# Patient Record
Sex: MALE | Race: WHITE | NOT HISPANIC OR LATINO | Employment: STUDENT | URBAN - METROPOLITAN AREA
[De-identification: names, ages, dates, MRNs, and addresses within clinical notes are randomized per-mention and may not be internally consistent; named-entity substitution may affect disease eponyms.]

---
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
## 2018-01-09 NOTE — MISCELLANEOUS
Message   Recorded as Task   Date: 05/23/2016 04:20 PM, Created By: Jose Simpson   Task Name: Medical Complaint Callback   Assigned To: Yolanda Jain   Regarding Patient: Stacy Hinson, Status: Active   CommentLaird Ast - 23 May 2016 4:20 PM     TASK CREATED  Caller: Eneida Witt, Mother; Medical Complaint; [phone] (Day)  Mom request to speak with our nurse regarding Ricky medication prednisone  Please call mom  Thanks  Yolanda Jain - 23 May 2016 4:43 PM     TASK EDITED  HE IS DECREASING TO 25 MG FOR ONE WEEKA ND MOM WILL CALL WITH UPDATE        Active Problems    1  Crohn's disease of small intestine without complication (070 0) (S47 85)   2  Gastroesophageal reflux disease with esophagitis (530 11) (K21 0)   3  Vitamin D deficiency (268 9) (E55 9)    Current Meds   1  Folic Acid 1 MG Oral Tablet; TAKE 1 TABLET DAILY; Therapy: 56CMG1592 to (Corinna Larsen)  Requested for: 40MWP8367; Last   CA:59MGX0458 Ordered   2  NexIUM 40 MG Oral Capsule Delayed Release (Esomeprazole Magnesium); Therapy: (Recorded:92Tux6438) to Recorded   3  Singulair 10 MG Oral Tablet (Montelukast Sodium); Therapy: (Recorded:43Fok1773) to Recorded   4  Vitamin D 2000 UNIT Oral Capsule; take one capsule daily by mouth; Therapy: 49DLZ9553 to (Evaluate:86Ouu4579); Last Rx:17Clz4243 Ordered    Allergies    1   Amoxicillin TABS    Signatures   Electronically signed by : Shruthi Damon, ; May 23 2016  4:47PM EST                       (Author)

## 2018-01-09 NOTE — MISCELLANEOUS
Message   Recorded as Task   Date: 03/22/2016 03:36 PM, Created By: Roxy Higgins   Task Name: Medical Complaint Callback   Assigned To: Yolanda Jain   Regarding Patient: Brodie Garay, Status: Active   CommentDernadine Bailon - 22 Mar 2016 3:36 PM     TASK CREATED  Caller: CHECO, Mother; Medical Complaint; [phone]  MOM CALLED  MOM SAID THAT DURING THE LAST OFFICE VISIT THERE WAS A CONSULT ABOUT STARTING AZATHIOPRINE   MOM WANTS TO KNOW IF SOMETHING ELSE CAN BE DONE BEFORE STARTING THE MEDICATION  MOM IS HESISTANT ON STARTING THE MEDICATION DUE TO THE SIDE EFFECTS AND RISKS ASSOCIATED WITH THE MEDICATION  Yolanda Jain - 22 Mar 2016 3:43 PM     TASK REASSIGNED: Previously Assigned To Yolanda Jain  I'm not sure what else you were planning to give him without side effects  Bharathi Bearden - 22 Mar 2016 4:59 PM     TASK REPLIED TO: Previously Assigned To Bharathi Bearden  All of the medications have similar side effects  Keep him on steroids until f/u--will discuss again  Yolanda Jain - 23 Mar 2016 8:17 AM     TASK REASSIGNED: Previously Assigned To Yolanda Jain  DIANA IS NOT ON STEROIDS YET  LOOKS LIKE FROM YOUR NOTE WE ARE WAITING FOR TPMT   QUANTIFERON GOLD NEGATIVE  YOU WERE WAITING FOR THESE RESULTS AND THEN WOULD START PREDNISONE AND AZATHIOPRINE  DO YOU WANT TO START PREDNISONE NOW? Bharathi Bearden - 23 Mar 2016 8:26 AM     TASK REPLIED TO: Previously Assigned To Bharathi Bearden  Start Prednisone 40 mg/day  Ask family to call in 1 week with report  Yolanda Jain - 23 Mar 2016 9:01 AM     TASK REASSIGNED: Previously Assigned To Yolanda Jain  HAD CONVERSATION WITH MOM AND EXPLAINED WHY AND HOW PREDNISONE WORKS  MOM DID NOT UNDERSTAND WHY PREDNISONE IS USED  EXPLAINED TO HER THAT HE WILL NEED TO BE ON SOME SORT OF MEDICATION FOR THE CROHNS  MOM SEEMED TO HAVE A BETTER UNDERSTANDING AFTER OUR CONVERSATION   SHE WILL START PREDNISONE AND DISCUSS AT F/U   Bharathi Bearden - 23 Mar 2016 10:58 AM     TASK REPLIED TO: Previously Assigned To Bharathi Bearden  Thanks        Active Problems    1  Abnormal weight loss (783 21) (R63 4)   2  Crohn's disease of small intestine without complication (124 1) (L58 48)   3  Gastroesophageal reflux disease with esophagitis (530 11) (K21 0)   4  Vitamin D deficiency (268 9) (E55 9)    Current Meds   1  NexIUM 40 MG Oral Capsule Delayed Release (Esomeprazole Magnesium); Therapy: (Recorded:03Feb2016) to Recorded   2  PredniSONE 10 MG Oral Tablet; take 4 tablets ( 40 mg )   by mouth daily in am;   Therapy: 13VSF2555 to (Evaluate:21Jul2016)  Requested for: 70VQU9694; Last   Rx:23Mar2016 Ordered   3  Singulair 10 MG Oral Tablet (Montelukast Sodium); Therapy: (Recorded:03Feb2016) to Recorded   4  Vitamin D 2000 UNIT Oral Capsule; take one capsule daily by mouth; Therapy: 60RFW0964 to (Evaluate:11Jun2016); Last Rx:93Vsc3939 Ordered    Allergies    1   Amoxicillin TABS    Signatures   Electronically signed by : Andres Olson, ; Mar 23 2016 11:05AM EST                       (Author)

## 2018-01-09 NOTE — MISCELLANEOUS
Message   Recorded as Task   Date: 03/20/2016 06:34 AM, Created By: Esteban Fernandes   Task Name: Call Patient with results   Assigned To: Yolanda Jain   Regarding Patient: Telly Briones, Status: Active   Comment:    Bharathi Bearden - 20 Mar 2016 6:34 AM     Patient Phone: [phone]      Quantiferon gold is negative  Task to Lizzie Bi - 21 Mar 2016 8:37 AM     TASK REASSIGNED: Previously Assigned To Bharathi Bearden        Active Problems    1  Abnormal weight loss (783 21) (R63 4)   2  Crohn's disease of small intestine without complication (185 6) (F62 05)   3  Gastroesophageal reflux disease with esophagitis (530 11) (K21 0)   4  Vitamin D deficiency (268 9) (E55 9)    Current Meds   1  NexIUM 40 MG Oral Capsule Delayed Release (Esomeprazole Magnesium); Therapy: (Recorded:00Rap0605) to Recorded   2  Singulair 10 MG Oral Tablet (Montelukast Sodium); Therapy: (Recorded:28Vyo8797) to Recorded   3  Vitamin D 2000 UNIT Oral Capsule; take one capsule daily by mouth; Therapy: 19JEB1731 to (Evaluate:11Jun2016); Last Rx:04Wgm7631 Ordered    Allergies    1   Amoxicillin TABS    Signatures   Electronically signed by : Deonte Ricardo, ; Mar 21 2016  8:46AM EST                       (Author)

## 2018-01-09 NOTE — RESULT NOTES
Message   Task to Benedict Lawrence  ESR 13, improved   CRP 10 5 improved   CMP normal   CBC ok except mild WBC and platelet elevation--consistent with the ESR and CRP     Verified Results  (1) CBC/PLT/DIFF 41XHJ7018 04:10PM Ovidio Bearden     Test Name Result Flag Reference   WBC COUNT 12 15 Thousand/uL H 4 31-10 16   RBC COUNT 4 66 Million/uL  3 88-5 62   HEMOGLOBIN 12 4 g/dL  12 0-17 0   HEMATOCRIT 38 4 %  36 5-49 3   MCV 82 fL  82-98   MCH 26 6 pg L 26 8-34 3   MCHC 32 3 g/dL  31 4-37 4   RDW 14 4 %  11 6-15 1   MPV 9 2 fL  8 9-12 7   PLATELET COUNT 308 Thousands/uL H 149-390   nRBC AUTOMATED 0 /100 WBCs     NEUTROPHILS RELATIVE PERCENT 78 % H 43-75   LYMPHOCYTES RELATIVE PERCENT 15 %  14-44   MONOCYTES RELATIVE PERCENT 7 %  4-12   EOSINOPHILS RELATIVE PERCENT 0 %  0-6   BASOPHILS RELATIVE PERCENT 0 %  0-1   NEUTROPHILS ABSOLUTE COUNT 9 48 Thousands/?L H 1 85-7 62   LYMPHOCYTES ABSOLUTE COUNT 1 77 Thousands/?L  0 60-4 47   MONOCYTES ABSOLUTE COUNT 0 85 Thousand/?L  0 17-1 22   EOSINOPHILS ABSOLUTE COUNT 0 03 Thousand/?L  0 00-0 61   BASOPHILS ABSOLUTE COUNT 0 02 Thousands/?L  0 00-0 10     (1) COMPREHENSIVE METABOLIC PANEL 70OHJ6479 68:78EE Ovidio Bearden     Test Name Result Flag Reference   GLUCOSE,RANDM 108 mg/dL     If the patient is fasting, the ADA then defines impaired fasting glucose as > 100 mg/dL and diabetes as > or equal to 123 mg/dL     SODIUM 141 mmol/L  136-145   POTASSIUM 3 7 mmol/L  3 5-5 3   CHLORIDE 103 mmol/L  100-108   CARBON DIOXIDE 32 mmol/L  21-32   ANION GAP (CALC) 6 mmol/L  4-13   BLOOD UREA NITROGEN 12 mg/dL  5-25   CREATININE 0 90 mg/dL  0 60-1 30   Standardized to IDMS reference method   CALCIUM 9 0 mg/dL  8 3-10 1   BILI, TOTAL 0 20 mg/dL  0 20-1 00   ALK PHOSPHATAS 114 U/L     ALT (SGPT) 24 U/L  12-78   AST(SGOT) 11 U/L  5-45   ALBUMIN 3 6 g/dL  3 5-5 0   TOTAL PROTEIN 7 6 g/dL  6 4-8 2   eGFR Non-African American      >60 0 ml/min/1 73sq daiana Cavanaugh [place] [employer] Disease Education Program recommendations are as follows:  GFR calculation is accurate only with a steady state creatinine  Chronic Kidney disease less than 60 ml/min/1 73 sq  meters  Kidney failure less than 15 ml/min/1 73 sq  meters       (1) C-REACTIVE PROTEIN 79VQM1297 04:09PM David Bearden     Test Name Result Flag Reference   C-REACT PROTEIN 10 9 mg/L H <3 0     (1) SED RATE 92JNZ7903 04:09PM David Bearden     Test Name Result Flag Reference   SED RATE 13 mm/hour H 0-10       Signatures   Electronically signed by : MICHAEL Antoine ; Jun 20 2016  8:31AM EST                       (Author)

## 2018-01-10 NOTE — RESULT NOTES
Message   Task to [facility], CRP 21, elevated   ESR 20, mild elevation   CMP normal   CBC mild WBC elevation     Verified Results  (1) CBC/PLT/DIFF 74VQQ8040 04:27PM Brennen Bearden     Test Name Result Flag Reference   WBC COUNT 10 85 Thousand/uL H 4 31-10 16   RBC COUNT 4 79 Million/uL  3 88-5 62   HEMOGLOBIN 12 5 g/dL  12 0-17 0   HEMATOCRIT 39 2 %  36 5-49 3   MCV 82 fL  82-98   MCH 26 1 pg L 26 8-34 3   MCHC 31 9 g/dL  31 4-37 4   RDW 15 1 %  11 6-15 1   MPV 8 8 fL L 8 9-12 7   PLATELET COUNT 612 Thousands/uL  149-390   nRBC AUTOMATED 0 /100 WBCs     NEUTROPHILS RELATIVE PERCENT 80 % H 43-75   LYMPHOCYTES RELATIVE PERCENT 10 % L 14-44   MONOCYTES RELATIVE PERCENT 10 %  4-12   EOSINOPHILS RELATIVE PERCENT 0 %  0-6   BASOPHILS RELATIVE PERCENT 0 %  0-1   NEUTROPHILS ABSOLUTE COUNT 8 63 Thousands/?L H 1 85-7 62   LYMPHOCYTES ABSOLUTE COUNT 1 09 Thousands/?L  0 60-4 47   MONOCYTES ABSOLUTE COUNT 1 11 Thousand/?L  0 17-1 22   EOSINOPHILS ABSOLUTE COUNT 0 01 Thousand/?L  0 00-0 61   BASOPHILS ABSOLUTE COUNT 0 01 Thousands/?L  0 00-0 10     (1) COMPREHENSIVE METABOLIC PANEL 24MMV9720 53:03VT Brennen Bearden     Test Name Result Flag Reference   GLUCOSE,RANDM 94 mg/dL     If the patient is fasting, the ADA then defines impaired fasting glucose as > 100 mg/dL and diabetes as > or equal to 123 mg/dL     SODIUM 137 mmol/L  136-145   POTASSIUM 3 8 mmol/L  3 5-5 3   CHLORIDE 100 mmol/L  100-108   CARBON DIOXIDE 32 mmol/L  21-32   ANION GAP (CALC) 5 mmol/L  4-13   BLOOD UREA NITROGEN 14 mg/dL  5-25   CREATININE 0 80 mg/dL  0 60-1 30   Standardized to IDMS reference method   CALCIUM 9 1 mg/dL  8 3-10 1   BILI, TOTAL 0 15 mg/dL L 0 20-1 00   ALK PHOSPHATAS 140 U/L     ALT (SGPT) 42 U/L  12-78   AST(SGOT) 15 U/L  5-45   ALBUMIN 3 6 g/dL  3 5-5 0   TOTAL PROTEIN 8 0 g/dL  6 4-8 2   eGFR Non-African American      >60 0 ml/min/1 73sq m   [facility] [employer] Disease Education Program recommendations are as follows:  GFR calculation is accurate only with a steady state creatinine  Chronic Kidney disease less than 60 ml/min/1 73 sq  meters  Kidney failure less than 15 ml/min/1 73 sq  meters       (1) SED RATE [id number] 03:34PM Ovidio Bearden     Test Name Result Flag Reference   SED RATE 20 mm/hour H 0-10     (1) C-REACTIVE PROTEIN 93USK3539 03:34PM Ovidio Bearden     Test Name Result Flag Reference   C-REACT PROTEIN 21 5 mg/L H <3 0   15       Signatures   Electronically signed by : MICHAEL Morales ; May 14 2016  6:06PM EST                       (Author)

## 2018-01-10 NOTE — MISCELLANEOUS
Message   Recorded as Task   Date: 03/11/2016 12:49 PM, Created By: Moses Lau   Task Name: Medical Complaint Callback   Assigned To: Maria Teresa Magdaleno   Regarding Patient: Edgardo Richardson, Status: Active   CommentClary Doug - 11 Mar 2016 12:49 PM     TASK CREATED  Caller: CHECO, Mother; Medical Complaint; [phone]  MOM CALLED TO SCHEDULE AN APPT FOR YOU NEXT WEEK AS A FOLLOW UP REGARDING THE TESTING THE HAD DONE ON TUESDAY  YOUR SCHEDULE IS BOOKED AND I AM NOT SURE WHERE I CAN PUT HIM   Bharathi Bearden - 11 Mar 2016 5:59 PM     TASK REPLIED TO: Previously Assigned To Bharathi Bearden  do your best--let's discuss Monday   Maria Teresa Magdaleno - 14 Mar 2016 2:04 PM     TASK EDITED  SEE OTHER MESSAGE        Active Problems    1  Abdominal pain, periumbilical (406 25) (X43 06)   2  Abnormal weight loss (783 21) (R63 4)   3  Vitamin D deficiency (268 9) (E55 9)    Current Meds   1  NexIUM 40 MG Oral Capsule Delayed Release (Esomeprazole Magnesium); Therapy: (Recorded:90Vou1192) to Recorded   2  Singulair 10 MG Oral Tablet (Montelukast Sodium); Therapy: (Recorded:44Xqa7720) to Recorded   3  Vitamin D 2000 UNIT Oral Capsule; take one capsule daily by mouth; Therapy: 05VYD6670 to (Evaluate:11Jun2016); Last Rx:08Oef1667 Ordered    Allergies    1   Amoxicillin TABS    Signatures   Electronically signed by : Prashant Cadena, ; Mar 14 2016  2:05PM EST                       (Author)

## 2018-01-10 NOTE — RESULT NOTES
Verified Results  (1) CBC/PLT/DIFF 54HHN5664 05:25PM [facility] Order Number: [id number]     Test Name Result Flag Reference   WBC COUNT 5 94 Thousand/uL  4 31-10 16   RBC COUNT 4 35 Million/uL  3 88-5 62   HEMOGLOBIN 13 1 g/dL  12 0-17 0   HEMATOCRIT 39 9 %  36 5-49 3   MCV 92 fL  82-98   MCH 30 1 pg  26 8-34 3   MCHC 32 8 g/dL  31 4-37 4   RDW 14 2 %  11 6-15 1   MPV 9 4 fL  8 9-12 7   PLATELET COUNT 814 Thousands/uL  149-390   nRBC AUTOMATED 0 /100 WBCs     This is an appended report  These results have been appended to a previously preliminary verified report  This is an appended report  These results have been appended to a previously verified report  NEUTROPHILS - REL 22 % L 43-75   LYMPHOCYTES - REL 60 % H 14-44   MONOCYTES - REL 12 %  4-12   EOSINOPHILS - REL 0 %  0-6   BASOPHILS - REL 1 %  0-1   ATYPICAL LYMPH 5 % H <=0   NEUTROPHILS ABS 1 31 Thousand/uL L 1 85-7 62   LYMPHOTCYTES ABS 3 56 Thousand/uL  0 60-4 47   MONOCYTES ABS 0 71 Thousand/uL  0 00-1 22   EOSINOPHILS ABS 0 00 Thousand/uL  0 00-0 40   BASOPHILS ABS 0 06 Thousand/uL  0 00-0 10   TOTAL COUNTED 100     RBC MORPHOLOGY Normal     PLT ESTIMATE Adequate  Adequate     (1) C-REACTIVE PROTEIN 15PHV2597 05:25PM [facility] Order Number: [id number]     Test Name Result Flag Reference   C-REACT PROTEIN 6 2 mg/L H <3 0     (1) COMPREHENSIVE METABOLIC PANEL 24KSO3353 87:29BU [facility] Order Number: [id number]     Test Name Result Flag Reference   GLUCOSE,RANDM 89 mg/dL     If the patient is fasting, the ADA then defines impaired fasting glucose as > 100 mg/dL and diabetes as > or equal to 123 mg/dL     SODIUM 140 mmol/L  136-145   POTASSIUM 4 1 mmol/L  3 5-5 3   CHLORIDE 104 mmol/L  100-108   CARBON DIOXIDE 32 mmol/L  21-32   ANION GAP (CALC) 4 mmol/L  4-13   BLOOD UREA NITROGEN 7 mg/dL  5-25   CREATININE 0 69 mg/dL  0 60-1 30   Standardized to IDMS reference method   CALCIUM 9 2 mg/dL  8 3-10 1   BILI, TOTAL 0 52 mg/dL  0 20-1 00   ALK PHOSPHATAS 320 U/L     ALT (SGPT) 179 U/L H 12-78   AST(SGOT) 78 U/L H 5-45   ALBUMIN 3 6 g/dL  3 5-5 0   TOTAL PROTEIN 8 0 g/dL  6 4-8 2   eGFR Non-African American      >60 0 ml/min/1 73sq m   [facility] Disease Education Program recommendations are as follows:  GFR calculation is accurate only with a steady state creatinine  Chronic Kidney disease less than 60 ml/min/1 73 sq  meters  Kidney failure less than 15 ml/min/1 73 sq  meters  (1) Marcell Sykes VIRUS [id number] 05:25PM Norbert Bearden Order Number: [id number]     Test Name Result Flag Reference   EBV EARLY ANTIGEN AB, IGG 67 3 U/mL H 0 0 - 8 9   Hepatitis A, Hepatitis C and HIV antibodies may cross-react  with this assay                                     Negative        < 9 0                                   Equivocal  9 0 - 10 9                                   Positive        >10 9   [place] VCA IGG 45 0 U/mL H 0 0 - 17 9   Negative        <18 0                                   Equivocal 18 0 - 21 9                                   Positive        >21 9   [place] VCA IGM >160 0 U/mL H 0 0 - 35 9   Negative        <36 0                                   Equivocal 36 0 - 43 9                                   Positive        >43 9   [place] NUCLEAR ANTIGEN AB IGG <18 0 U/mL  0 0 - 17 9   Negative        <18 0                                   Equivocal 18 0 - 21 9                                   Positive        >21 9   EBV INTERPRETATION Comment     EBV Interpretation Chart  Interpretation   EBV-IgM  EA(D)-IgG  VCA-IgG  EBNA-IgG  EBV Seronegative    -        -         -          -  Early Phase         +        -         -          -  Acute Primary       +       +or-       +          -  Infection  Convalescence/Past  -       +or-       +          +  Infection  Reactivated        +or-      +         +          +  Infection         + Antibody Present - Antibody Absent    Performed at:  [address]  [id number]  : Leo Manley MD, Phone:  [phone]     (1) SED RATE [id number] 05:25PM Ancelmo Gomez Order Number: [id number]     Test Name Result Flag Reference   SED RATE 20 mm/hour H 0-10

## 2018-01-10 NOTE — MISCELLANEOUS
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  If not, I would like to change him to oral methotrexate within the next week or 2  Yolanda Jain - 25 Jul 2016 4:18 PM     TASK REASSIGNED: Previously Assigned To Yolanda Jain      SPOKE WITH MOM REGARDING THE MTX INJECTIONS AND EXPLAINED THAT THEY CANNOT GET THE PREFILLED SYRINGES BUT IT WILL COME IN A SINGLE VIAL DOSE AND I CAN TEACH TO DRAW UP AND INJECT  MOM SAID SHE WOULD SPEAK TO DIANA AND SHE CALLED BACK AND STATES THAT DIANA WOULD LIKE TO TRY THE ORAL MTX   Bharathi Bearden - 25 Jul 2016 5:09 PM     TASK REPLIED TO: Previously Assigned To Kali Park  I am ok with oral--if we do that we should start ASAP so we can see how he does  Yolanda Jain - 25 Jul 2016 5:39 PM     TASK REASSIGNED: Previously Assigned To Yolanda Jain    MOM AWARE TO START 25 MG WEEKLY DO ON A FRIDAY EVENING  TABS ARE 2 5 MG WILL TAKE 10 TABS WEEKLY  MOM TO CALL INFUSION CENTER TO CX INFUSIONS        Active Problems    1  Crohn's disease of small intestine without complication (809 9) (Y81 45)   2  Gastroesophageal reflux disease with esophagitis (530 11) (K21 0)   3  Vitamin D deficiency (268 9) (E55 9)    Current Meds   1  Esomeprazole Magnesium 40 MG Oral Capsule Delayed Release (NexIUM); TAKE 1   CAPSULE Once PRN heartburn; Last Rx:69Fjb7846 Ordered   2  Folic Acid 1 MG Oral Tablet; TAKE 1 TABLET DAILY;    Therapy: 88ITS2702 to (Evaluate:01Sep2016)  Requested for: 05NOJ7097; Last   ZQ:43IUV8695 Ordered   3  PredniSONE 10 MG Oral Tablet; take 5 mg daily; Therapy: 60ICJ5892 to (Evaluate:20Sep2016)  Requested for: 95Fqv7794; Last   Rx:47Cqb1191 Ordered   4  Singulair 10 MG Oral Tablet (Montelukast Sodium); Therapy: (Recorded:21Epm0408) to Recorded   5  Vitamin D 2000 UNIT Oral Capsule; take one capsule daily by mouth; Therapy: 99BCL8636 to (Evaluate:11Jun2016); Last Rx:80Hhn3391 Ordered    Allergies    1   Amoxicillin TABS    Signatures   Electronically signed by : Deonte Ricardo, ; Jul 25 2016  5:40PM EST                       (Author)

## 2018-01-10 NOTE — MISCELLANEOUS
Message   Recorded as Task   Date: 10/04/2016 03:50 PM, Created By: John Adams   Task Name: Follow Up   Assigned To: Yolanda Jain   Regarding Patient: Isamar Bartlett, Status: Active   CommentCosette Borer - 04 Oct 2016 3:50 PM     TASK CREATED  CBC and CMP, CRP are okay        Active Problems    1  Crohn's disease of small intestine without complication (605 2) (D33 23)   2  Gastroesophageal reflux disease with esophagitis (530 11) (K21 0)   3  Vitamin D deficiency (268 9) (E55 9)    Current Meds   1  Famotidine 20 MG Oral Tablet; take 1 tablet by mouth twice a day; Therapy: 10Eqo1183 to (Evaluate:36Qow5404)  Requested for: 12Fnb5989; Last   Rx:15Mjl0817; Status: [facility]  to Pharmacy - Awaiting Verification   Ordered   2  Folic Acid 1 MG Oral Tablet; TAKE 1 TABLET DAILY; Therapy: 51OTH9809 to (Evaluate:23Afi8164)  Requested for: 00Quj2394; Last   Rx:49Lro1821 Ordered   3  Methotrexate 2 5 MG Oral Tablet; TAKE 10 TABLETS WEEKLY; Therapy: 17INV5894 to (Kenyatta Martel)  Requested for: 82Vmw1980; Last   Rx:11Bzh3675 Ordered   4  Singulair 10 MG Oral Tablet (Montelukast Sodium); Therapy: (Recorded:44Voi3542) to Recorded    Allergies    1   Amoxicillin TABS    Signatures   Electronically signed by : Joseluis Barnes, ; Oct  4 2016  4:08PM EST                       (Author)

## 2018-01-10 NOTE — MISCELLANEOUS
Message   Recorded as Task   Date: 06/13/2016 02:51 PM, Created By: Gladis Sofia   Task Name: Medical Complaint Callback   Assigned To: Yolanda Jain   Regarding Patient: Natalie Enrique, Status: Active   CommentBryant Cho - 13 Jun 2016 2:51 PM     TASK CREATED  Caller: Savanna Stroud, Mother; Medical Complaint; [phone] (Day)  Mom called if script for BW can faxt to the same lab [facility] get his mtx injection  Pt is going for this injection on Friday 06/17/2016 at 3:30pm    Yolanda Jain - 14 Jun 2016 4:22 PM     TASK EDITED  mom aware to get labs out patient before mtx on friday        Active Problems    1  Crohn's disease of small intestine without complication (835 8) (Y72 00)   2  Gastroesophageal reflux disease with esophagitis (530 11) (K21 0)   3  Vitamin D deficiency (268 9) (E55 9)    Current Meds   1  Folic Acid 1 MG Oral Tablet; TAKE 1 TABLET DAILY; Therapy: 69ZEE5330 to (Ines Dust)  Requested for: 71UMP5837; Last   RZ:49AYK4308 Ordered   2  NexIUM 40 MG Oral Capsule Delayed Release (Esomeprazole Magnesium); Therapy: (Recorded:50Nnx7828) to Recorded   3  PredniSONE 10 MG Oral Tablet; TAKE 2 TABLETS DAILY; Therapy: 85NKX4328 to (Evaluate:08Jun2016)  Requested for: 92YIP5339; Last   Rx:01Jun2016 Ordered   4  Singulair 10 MG Oral Tablet (Montelukast Sodium); Therapy: (Recorded:55Xod1699) to Recorded   5  Vitamin D 2000 UNIT Oral Capsule; take one capsule daily by mouth; Therapy: 14WEZ2246 to (Evaluate:11Jun2016); Last Rx:41Mvd0256 Ordered    Allergies    1   Amoxicillin TABS    Signatures   Electronically signed by : Jackelyn Fritz, ; Jun 14 2016  4:22PM EST                       (Author)

## 2018-01-10 NOTE — RESULT NOTES
Message   Quantiferon gold is negative  Task [facility]     Verified Results  (1) QUANTIFERON - TB GOLD 73ICI0380 05:28PM [facility]ule[facility]Ov[facility]   Performed at:  [address]  [id number]  : Matt Ramirez MD, Phone:  [phone]     Test Name Result Flag Reference   QUANTIFERON TB GOLD      Specimen incubated at [address] [address] TB GOLD Negative  Negative   QUANTIFERON CRITERIA Comment     To be considered positive a specimen should have a TB Ag minus Nilvalue greater than or equal to 0 35 IU/mL and in addition the TB Agminus Nil value must be greater than or equal to 25% of the Nilvalue  There may be insufficient information in these values todifferentiate between some negative and some indeterminate testvalues  QUANTIFERON TB AG VALUE 0 08 IU/mL     QUANTIFERON NIL VALUE 0 09 IU/mL     QUANTIFERON MITOGEN VALUE 8 65 IU/mL     QFT TB AG MINUS NIL VALUE <0 01 IU/mL     QFT INTERPRETATION Comment     The QuantiFERON TB Gold (in Tube) assay is intended for use as an aidin the diagnosis of TB infection  Negative results suggest that thereis no TB infection  In patients with high suspicion of exposure, anegative test should be repeated  A positive test indicates infectionwith Mycobacterium tuberculosis  Among individuals withouttuberculosis infection, a positive test may be due to exposure Claudio  mary, M  josue or M  marinum  On the Internet, go tocdc gov/tb for further details         Signatures   Electronically signed by : MICHAEL Morales ; Mar 20 2016  6:34AM EST                       (Author)

## 2018-01-11 NOTE — MISCELLANEOUS
Message   Recorded as Task   Date: 03/25/2016 03:31 PM, Created By: Davie Ham   Task Name: Follow Up   Assigned To: Yolanda Jain   Regarding Patient: Natasha Lovell, Status: Active   Comment:    Bharathi Bearden - 25 Mar 2016 3:31 PM     TASK CREATED  TPMT is normal        Active Problems    1  Abnormal weight loss (783 21) (R63 4)   2  Crohn's disease of small intestine without complication (545 6) (L32 48)   3  Gastroesophageal reflux disease with esophagitis (530 11) (K21 0)   4  Vitamin D deficiency (268 9) (E55 9)    Current Meds   1  NexIUM 40 MG Oral Capsule Delayed Release (Esomeprazole Magnesium); Therapy: (Recorded:14Fwt6951) to Recorded   2  PredniSONE 10 MG Oral Tablet; take 4 tablets ( 40 mg )   by mouth daily in am;   Therapy: 73XRE7760 to (Evaluate:09Nhp5244)  Requested for: 67XLQ3447; Last   Rx:23Mar2016 Ordered   3  Singulair 10 MG Oral Tablet (Montelukast Sodium); Therapy: (Recorded:31Hsm3447) to Recorded   4  Vitamin D 2000 UNIT Oral Capsule; take one capsule daily by mouth; Therapy: 53MGB1752 to (Evaluate:11Jun2016); Last Rx:03Szb2863 Ordered    Allergies    1   Amoxicillin TABS    Signatures   Electronically signed by : Hao Tadeo, ; Mar 25 2016  3:48PM EST                       (Author)

## 2018-01-11 NOTE — RESULT NOTES
Message  Periodic surveillance laboratories are within normal limits- Sailaja Reed has follow-up during winter break on the 28th of this month  Signatures   Electronically signed by : JACKIE Ashley;  Dec  5 2016  9:17AM EST                       (Author)

## 2018-01-11 NOTE — RESULT NOTES
Message   ESR normal   CRP 6 8, mildly elevated but improving   CMP ok   CBC ok   Task to Jennifer     Verified Results  (1) CBC/PLT/DIFF 37Iwo6024 01:10PM Joséanoopalpesh Clarissa Checos Order Number: [id number]     Test Name Result Flag Reference   WBC COUNT 8 00 Thousand/uL  4 31-10 16   WBC ADJUSTED 8 00 Thousand/uL  4 31-10 16   RBC COUNT 4 91 Million/uL  3 88-5 62   HEMOGLOBIN 12 6 g/dL  12 0-17 0   HEMATOCRIT 40 7 %  36 5-49 3   MCV 83 fL  82-98   MCH 25 6 pg L 26 8-34 3   MCHC 30 9 g/dL L 31 4-37 4   RDW 16 2 % H 11 6-15 1   MPV 7 5 fL L 8 9-12 7   PLATELET COUNT 688 Thousands/uL  149-390   GRANULOCYTES - REL 71 2 %  47-80   LYMPHOCYTES RELATIVE PERCENT 27 %  14-44   MONOCYTES RELATIVE PERCENT 2 % L 4-12   GRANS ABS 5 70 Thousand/?L  1 85-7 82   LYMPHOCYTES ABSOLUTE COUNT 2 20 Thousands/?L  0 60-4 47   MONOCYTES ABSOLUTE COUNT 0 10 Thousand/?L L 0 17-1 22     (1) COMPREHENSIVE METABOLIC PANEL 59ZJA0409 93:97IU JoséanoopalpeshCandyon Checos Order Number: [id number]     Test Name Result Flag Reference   GLUCOSE,RANDM 91 mg/dL     If the patient is fasting, the ADA then defines impaired fasting glucose as > 100 mg/dL and diabetes as > or equal to 123 mg/dL     SODIUM 141 mmol/L  136-145   POTASSIUM 4 4 mmol/L  3 5-5 3   CHLORIDE 106 mmol/L  100-108   CARBON DIOXIDE 28 mmol/L  21-32   ANION GAP (CALC) 7 mmol/L  4-13   BLOOD UREA NITROGEN 10 mg/dL  5-25   CREATININE 1 03 mg/dL  0 60-1 30   Standardized to IDMS reference method   CALCIUM 9 3 mg/dL  8 3-10 1   BILI, TOTAL 0 30 mg/dL  0 20-1 00   ALK PHOSPHATAS 88 U/L     ALT (SGPT) <6 U/L L 12-78   AST(SGOT) 6 U/L  5-45   ALBUMIN 4 0 g/dL  3 5-5 0   TOTAL PROTEIN 7 1 g/dL  6 4-8 2   eGFR Non-African American      >60 0 ml/min/1 73sq m   [facility] Disease Education Program recommendations are as follows:  GFR calculation is accurate only with a steady state creatinine  Chronic Kidney disease less than 60 ml/min/1 73 sq  meters  Kidney failure less than 15 ml/min/1 73 sq  meters       (1) SED RATE 59Tcg0986 11:30AM Ashely Bearden Order Number: [id number]     Test Name Result Flag Reference   SED RATE 6 mm/hour  0-10     (1) C-REACTIVE PROTEIN 90Bap7596 11:30AM Ashely Bearden Order Number: [id number]   Order Number: [id number]     Test Name Result Flag Reference   C-REACT PROTEIN 6 8 mg/L H <3 0

## 2018-01-11 NOTE — MISCELLANEOUS
Message   Recorded as Task   Date: 05/23/2016 04:20 PM, Created By: Lianne Jc   Task Name: Medical Complaint Callback   Assigned To: Yolanda Jain   Regarding Patient: Sherice Fernandez, Status: Active   CommentRodrigo Alex - 23 May 2016 4:20 PM     TASK CREATED  Caller: Rosio Dotson, Mother; Medical Complaint; [phone] (Day)  Mom request to speak with our nurse regarding Ricky medication prednisone  Please call mom  Thanks  Yolanda Jain - 23 May 2016 4:43 PM     TASK EDITED  HE IS DECREASING TO 25 MG FOR ONE WEEKA ND MOM WILL CALL WITH UPDATE        Active Problems    1  Crohn's disease of small intestine without complication (626 7) (H14 86)   2  Gastroesophageal reflux disease with esophagitis (530 11) (K21 0)   3  Vitamin D deficiency (268 9) (E55 9)    Current Meds   1  Folic Acid 1 MG Oral Tablet; TAKE 1 TABLET DAILY; Therapy: 55MDD5206 to (Villalba Officer)  Requested for: 50XCA2451; Last   ZZ:67RKT9311 Ordered   2  NexIUM 40 MG Oral Capsule Delayed Release (Esomeprazole Magnesium); Therapy: (Recorded:50Xqw8114) to Recorded   3  Singulair 10 MG Oral Tablet (Montelukast Sodium); Therapy: (Recorded:01Ukx3095) to Recorded   4  Vitamin D 2000 UNIT Oral Capsule; take one capsule daily by mouth; Therapy: 16DUL8978 to (Evaluate:11Jun2016); Last Rx:28Dxo8275 Ordered    Allergies    1   Amoxicillin TABS    Signatures   Electronically signed by : Andres Olson, ; May 23 2016  4:50PM EST                       (Author)

## 2018-01-11 NOTE — RESULT NOTES
Message      Task to [facility]   Distal ileum abnormal, most consistent with Crohn's     Verified Results  MRI ENTEROGRAPHY W WO 08OXN6280 10:00AM Edvin Bearden     Test Name Result Flag Reference   MRI ENTEROGRAPHY W WO (Report)     MRI ABDOMEN AND PELVIS WITH AND WITHOUT CONTRAST (MR ENTEROGRAPHY)     INDICATION: Abdominal pain, 15 pound weight loss  COMPARISON: No prior imaging at this institution  TECHNIQUE: The following pulse sequences of the abdomen and pelvis were obtained on a 1 5 T scanner: Coronal 2D FIESTA multiphase with fat saturation, axial 2D FIESTA with fat saturation, axial and coronal T2, pre-contrast coronal T1 with fat    saturation, post-contrast dynamic coronal T1 at 20, 70, and 180 seconds with fat saturation, and axial T1 post-contrast with fat saturation through the abdomen and pelvis  1500 mL of oral contrast containing 0 1% (wt/vol) barium suspension with sorbitol    (VoLumen) was administered 45 minutes prior to scanning  1 mg of glucagons was administered IM prior to contrast administration by the radiology nurse  6 mL of Gadavist gadolinium was administered intravenously without immediate complication  Note that dynamic imaging was tailored for evaluation of the bowel with limited evaluation of the remainder of the abdominal and pelvic viscera  FINDINGS:     ABDOMEN:   BOWEL:  There is a long contiguous segment of distal and terminal ileum with bowel wall thickening up to 8 mm, and bowel wall hyperenhancement (series 10 images 38 through 72 and series 11 images 119 through 158 ) Findings are consistent with active    inflammatory or infectious enteritis with Crohn's disease most likely  No evidence of fistula formation  No evidence of fibrostenotic disease  LIVER: Normal       GALLBLADDER: Normal      PANCREAS: Normal      ADRENAL GLANDS: Normal      SPLEEN: Normal      KIDNEYS: Normal      LUNG BASES: Unremarkable MRI appearance       PELVIS:   REPRODUCTIVE STRUCTURES:  Age-appropriate  BLADDER: Normal      OTHER STRUCTURES OF THE ABDOMEN AND PELVIS   OSSEOUS STRUCTURES:  No osseous destruction  VASCULAR STRUCTURES: Visualized vasculature is normal      ABDOMINOPELVIC CAVITY: No lymphadenopathy  ABDOMINOPELVIC WALL: Normal        IMPRESSION:     There is a long contiguous segment of distal and terminal ileum with bowel wall thickening and hyperenhancement (series 10 images 38 through 72 and series 11 images 119 through 158 ) Findings are consistent with active inflammatory or infectious    enteritis with Crohn's disease most likely         Workstation performed: [id number]     Signed by:   Bonita John MD   2/29/16   1250       Signatures   Electronically signed by : MICHAEL Odonnell ; Feb 29 2016  4:44PM EST                       (Author)

## 2018-01-11 NOTE — MISCELLANEOUS
Message   Recorded as Task   Date: 08/29/2016 12:02 PM, Created By: Eliazar Styles   Task Name: Med Renewal Request   Assigned To: Yolanda Jain   Regarding Patient: Anjel Yousif, Status: Active   CommentLisy Barajas - 29 Aug 2016 12:02 PM     TASK CREATED  Caller: adalgisa, Mother; Renew Medication; [phone]  please refill folic acid to pharmacy [facility] Anais Lopez - 29 Aug 2016 3:29 PM     TASK EDITED               sent        Active Problems    1  Crohn's disease of small intestine without complication (228 9) (O67 80)   2  Gastroesophageal reflux disease with esophagitis (530 11) (K21 0)   3  Vitamin D deficiency (268 9) (E55 9)    Current Meds   1  Famotidine 20 MG Oral Tablet; take 1 tablet by mouth twice a day; Therapy: 73Xse0684 to (Evaluate:49Qwe0869)  Requested for: 95Vrz2429; Last   Rx:56Bjy1711; Status: [address] to Pharmacy - Awaiting Verification   Ordered   2  Folic Acid 1 MG Oral Tablet; TAKE 1 TABLET DAILY; Therapy: 28HCG2282 to (Evaluate:51Xpu4592)  Requested for: 22Tro4973; Last   Rx:91Qrw5191 Ordered   3  Methotrexate 2 5 MG Oral Tablet; TAKE 10 TABLETS WEEKLY; Therapy: 67IDY5427 to (Gudelia Barba)  Requested for: 26Cap1474; Last   Rx:27Uen8623 Ordered   4  Singulair 10 MG Oral Tablet (Montelukast Sodium); Therapy: (Recorded:82Gnb6522) to Recorded    Allergies    1   Amoxicillin TABS    Signatures   Electronically signed by : Nell Seip, ; Aug 29 2016  3:29PM EST                       (Author)

## 2018-01-11 NOTE — MISCELLANEOUS
Message   Recorded as Task   Date: 07/22/2016 02:56 PM, Created By: Arlet Paul   Task Name: Medical Complaint Callback   Assigned To: Yolanda Jain   Regarding Patient: Natalie Enrique, Status: Active   Comment:    Yolanda Jain - 22 Jul 2016 2:56 PM     TASK CREATED  left message for mom to decrease to 5 mg and call in one week with update        Active Problems    1  Crohn's disease of small intestine without complication (944 0) (V91 60)   2  Gastroesophageal reflux disease with esophagitis (530 11) (K21 0)   3  Vitamin D deficiency (268 9) (E55 9)    Current Meds   1  Esomeprazole Magnesium 40 MG Oral Capsule Delayed Release (NexIUM); TAKE 1   CAPSULE Once PRN heartburn; Last Rx:80Bex0172 Ordered   2  Folic Acid 1 MG Oral Tablet; TAKE 1 TABLET DAILY; Therapy: 36PAY7198 to (Nilsa Figueroa)  Requested for: 34ZKB7897; Last   KZ:48GRB3903 Ordered   3  Methotrexate Sodium 25 MG/ML SOLN;   Therapy: (Recorded:18Wbi8343) to Recorded   4  PredniSONE 10 MG Oral Tablet; TAKE 10 MG Daily; Therapy: 79OQS7324 to (Evaluate:89Cpr2833)  Requested for: 53DRM2214; Last   Rx:43Hlg5416 Ordered   5  Singulair 10 MG Oral Tablet (Montelukast Sodium); Therapy: (Recorded:26Thb4914) to Recorded   6  Vitamin D 2000 UNIT Oral Capsule; take one capsule daily by mouth; Therapy: 24RYE3409 to (Evaluate:11Jun2016); Last Rx:19Swy4820 Ordered    Allergies    1   Amoxicillin TABS    Signatures   Electronically signed by : Jackelyn Fritz, ; Jul 22 2016  2:57PM EST                       (Author)

## 2018-01-12 VITALS
SYSTOLIC BLOOD PRESSURE: 124 MMHG | BODY MASS INDEX: 23.95 KG/M2 | DIASTOLIC BLOOD PRESSURE: 64 MMHG | WEIGHT: 171.08 LBS | HEIGHT: 71 IN | TEMPERATURE: 96.8 F

## 2018-01-12 NOTE — MISCELLANEOUS
Message   Recorded as Task   Date: 06/06/2016 11:42 AM, Created By: Irais Santiago   Task Name: Follow Up   Assigned To: Yolanda Jain   Regarding Patient: Stacy Hinson, Status: Active   CommentElonda Kayley - 06 Jun 2016 11:42 AM     TASK CREATED  mom states he is doing fine with the 20 mg prednisone and will stay on that dose until he comes back from vacation because he will miss two doses of mtx  mom to call while on vacation if he has increased s/s        Active Problems    1  Crohn's disease of small intestine without complication (508 1) (G89 19)   2  Gastroesophageal reflux disease with esophagitis (530 11) (K21 0)   3  Vitamin D deficiency (268 9) (E55 9)    Current Meds   1  Folic Acid 1 MG Oral Tablet; TAKE 1 TABLET DAILY; Therapy: 27DEU9930 to (Ada Cantrell)  Requested for: 54QYV0937; Last   SW:59MIS9740 Ordered   2  NexIUM 40 MG Oral Capsule Delayed Release (Esomeprazole Magnesium); Therapy: (Recorded:19Bvy4304) to Recorded   3  PredniSONE 10 MG Oral Tablet; TAKE 2 TABLETS DAILY; Therapy: 08ELJ0134 to (Evaluate:08Jun2016)  Requested for: 39TTL8518; Last   Rx:01Jun2016 Ordered   4  Singulair 10 MG Oral Tablet (Montelukast Sodium); Therapy: (Recorded:09Esu3082) to Recorded   5  Vitamin D 2000 UNIT Oral Capsule; take one capsule daily by mouth; Therapy: 00LDR9407 to (Evaluate:11Jun2016); Last Rx:53Pkz0247 Ordered    Allergies    1   Amoxicillin TABS    Signatures   Electronically signed by : Shruthi Damon, ; Jun 6 2016 11:42AM EST                       (Author)

## 2018-01-12 NOTE — MISCELLANEOUS
Message   Recorded as Task   Date: 03/01/2016 12:39 PM, Created By: Georgina Desai   Task Name: Medical Complaint Callback   Assigned To: Yolanda Jain   Regarding Patient: Mayra Monterroso, Status: Active   CommentVenus Mcmahan - 01 Mar 2016 12:39 PM     TASK CREATED  Caller: adaglisa, Mother; Medical Complaint; [phone]  Mom is calling because she would like for you to call her with the MRE results   Yolanda Jain - 01 Mar 2016 1:28 PM     TASK EDITED  see other note        Active Problems    1  Abdominal pain, periumbilical (880 53) (B09 56)   2  Abnormal weight loss (783 21) (R63 4)   3  Vitamin D deficiency (268 9) (E55 9)    Current Meds   1  NexIUM 40 MG Oral Capsule Delayed Release (Esomeprazole Magnesium); Therapy: (Recorded:69Rxy3799) to Recorded   2  Singulair 10 MG Oral Tablet (Montelukast Sodium); Therapy: (Recorded:20Gzw4273) to Recorded   3  Vitamin D 2000 UNIT Oral Capsule; take one capsule daily by mouth; Therapy: 14PTU1859 to (Evaluate:11Jun2016); Last Rx:80Dmc6482 Ordered    Allergies    1   Amoxicillin TABS    Signatures   Electronically signed by : Papa Crocker, ; Mar  1 2016  1:28PM EST                       (Author)

## 2018-01-12 NOTE — MISCELLANEOUS
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)   4  Gastroesophageal reflux disease with esophagitis (530 11) (K21 0)   5  Vitamin D deficiency (268 9) (E55 9)    Current Meds   1  Famotidine 20 MG Oral Tablet; Take one tablet every 12 hours as needed for abdominal   pain; Therapy: 35Ami3199 to (Evaluate:28Mar2017)  Requested for: 59Izq4124; Last   Rx:12Jge1397 Ordered   2  Folic Acid 1 MG Oral Tablet; TAKE 1 TABLET DAILY; Therapy: 14CNC5171 to (Mo Coffin)  Requested for: 97Riz5515; Last   Rx:33Vyt3335 Ordered   3  Methotrexate 2 5 MG Oral Tablet; TAKE 10 TABLETS WEEKLY; Therapy: 30VBO9417 to (Mo Coffin)  Requested for: 16Cyk2036; Last   Rx:43Veg4288 Ordered   4  Singulair 10 MG Oral Tablet (Montelukast Sodium); Therapy: (Recorded:11Okx9829) to Recorded    Allergies    1   Amoxicillin TABS    Signatures   Electronically signed by : Andres Olson, ; May 26 2017 10:07AM EST                       (Author)

## 2018-01-12 NOTE — MISCELLANEOUS
Message   Recorded as Task   Date: 06/08/2017 07:33 AM, Created By: Hawa Rivero   Task Name: Call Patient with results   Assigned To: Yolanda Jain   Regarding Patient: Lorne Garcia, Status: Active   Comment:    Bharathi Bearden - 08 Jun 2017 7:33 AM     Patient Phone: [phone]      Task to [facility]  CBC better  Repeat 1 month   Mary Ward - 08 Jun 2017 8:43 AM     TASK REASSIGNED: Previously Assigned To Jose Argueta - 08 Jun 2017 8:46 AM     TASK EDITED  pt has f/u with you this afternoon   Bharathi Bearden - 08 Jun 2017 9:08 AM     TASK REPLIED TO: Previously Assigned To Bharathi Bearden  ok        Active Problems    1  Abnormal CBC (790 6) (R79 89)   2  Crohn's disease of small intestine without complication (166 6) (H40 41)   3  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)   4  Gastroesophageal reflux disease with esophagitis (530 11) (K21 0)   5  Vitamin D deficiency (268 9) (E55 9)    Current Meds   1  Famotidine 20 MG Oral Tablet; Take one tablet every 12 hours as needed for abdominal   pain; Therapy: 67Aug3084 to (Evaluate:28Mar2017)  Requested for: 30Avy6367; Last   Rx:51Hxj8303 Ordered   2  Folic Acid 1 MG Oral Tablet; TAKE 1 TABLET DAILY; Therapy: 22PHJ7912 to (Quinter Coke)  Requested for: 03Kzq8700; Last   Rx:00Wqe6226 Ordered   3  Methotrexate 2 5 MG Oral Tablet; TAKE 10 TABLETS WEEKLY; Therapy: 46CBK6619 to (Quinter Coke)  Requested for: 92Gez2065; Last   Rx:16Whv0194 Ordered   4  Singulair 10 MG Oral Tablet (Montelukast Sodium); Therapy: (Recorded:84Uds5837) to Recorded    Allergies    1   Amoxicillin TABS    Signatures   Electronically signed by : Contreras Sheehan, ; Jun 8 2017  9:13AM EST                       (Author)

## 2018-01-12 NOTE — MISCELLANEOUS
Message   Recorded as Task   Date: 02/25/2016 11:56 AM, Created By: Fazal Bates   Task Name: Financial Authorization   Assigned To: Yolanda Jain   Regarding Patient: Miguelangel Santiago, Status: Active   Comment:    Bharathi Bearden - 25 Feb 2016 11:56 AM     TASK CREATED  Looks like he needs EGD/colon  When is his MRE? Yolanda Jain - 25 Feb 2016 12:06 PM     TASK REASSIGNED: Previously Assigned To Yolanda Jain  will schedule scopes after results of MRE        Active Problems    1  Abdominal pain, periumbilical (173 85) (U23 89)   2  Abnormal weight loss (783 21) (R63 4)   3  Vitamin D deficiency (268 9) (E55 9)    Current Meds   1  NexIUM 40 MG Oral Capsule Delayed Release (Esomeprazole Magnesium); Therapy: (Recorded:01Xfz3036) to Recorded   2  Singulair 10 MG Oral Tablet (Montelukast Sodium); Therapy: (Recorded:03Vpu0349) to Recorded   3  Vitamin D 2000 UNIT Oral Capsule; take one capsule daily by mouth; Therapy: 64WMR9662 to (Evaluate:59Mfx5470); Last Rx:55Xxg7426 Ordered    Allergies    1   Amoxicillin TABS    Signatures   Electronically signed by : Janeen Moore, ; Feb 25 2016  1:16PM EST                       (Author)

## 2018-01-13 VITALS
DIASTOLIC BLOOD PRESSURE: 70 MMHG | TEMPERATURE: 98.2 F | SYSTOLIC BLOOD PRESSURE: 122 MMHG | BODY MASS INDEX: 23.47 KG/M2 | WEIGHT: 173.28 LBS | HEIGHT: 72 IN

## 2018-01-13 VITALS
SYSTOLIC BLOOD PRESSURE: 125 MMHG | TEMPERATURE: 96.9 F | HEIGHT: 72 IN | BODY MASS INDEX: 24.63 KG/M2 | DIASTOLIC BLOOD PRESSURE: 74 MMHG | WEIGHT: 181.88 LBS

## 2018-01-13 NOTE — MISCELLANEOUS
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)   3  Gastroesophageal reflux disease with esophagitis (530 11) (K21 0)   4  Vitamin D deficiency (268 9) (E55 9)    Current Meds   1  Famotidine 20 MG Oral Tablet; Take one tablet every 12 hours as needed for abdominal   pain; Therapy: 24Lxi1683 to (Evaluate:28Mar2017)  Requested for: 23ZYF8488; Last   Rx:46Pbf1226 Ordered   2  Folic Acid 1 MG Oral Tablet; TAKE 1 TABLET DAILY; Therapy: 90GJB6165 to (Osman Ambriz)  Requested for: 58QVE4804; Last   Rx:10Guz9356 Ordered   3  Methotrexate 2 5 MG Oral Tablet; TAKE 10 TABLETS WEEKLY; Therapy: 84ZII5999 to (Osman Ambriz)  Requested for: 44Edy6502; Last   Rx:96Etg4811 Ordered   4  Singulair 10 MG Oral Tablet (Montelukast Sodium); Therapy: (Recorded:99Vtt4169) to Recorded    Allergies    1   Amoxicillin TABS    Signatures   Electronically signed by : Hussein Ulloa, ; Jun 23 2017 11:18AM EST                       (Author)

## 2018-01-13 NOTE — MISCELLANEOUS
Message   Recorded as Task   Date: 07/23/2016 09:28 PM, Created By: Kimmie Whatley   Task Name: Call Patient with results   Assigned To: Yolanda Jain   Regarding Patient: Shazia Smith, Status: Active   Comment:    Bharathi Bearden - 23 Jul 2016 9:28 PM     Patient Phone: [phone]      ESR normal   JoséBharathi saeed - 23 Jul 2016 9:29 PM     CRP 6 8, mildly elevated but improving   Bharathi Bearden - 23 Jul 2016 9:29 PM     CMP ok   Bharathi Bearden - 23 Jul 2016 9:29 PM     CBC ok  Task to Nessa Villarreal - 25 Jul 2016 1:56 PM     TASK REASSIGNED: Previously Assigned To Bharathi Bearden Cynthia - 25 Jul 2016 4:00 PM     TASK EDITED      MOM AWARE OF LAB RESULTS        Active Problems    1  Crohn's disease of small intestine without complication (293 7) (V17 39)   2  Gastroesophageal reflux disease with esophagitis (530 11) (K21 0)   3  Vitamin D deficiency (268 9) (E55 9)    Current Meds   1  Esomeprazole Magnesium 40 MG Oral Capsule Delayed Release (NexIUM); TAKE 1   CAPSULE Once PRN heartburn; Last Rx:47Yvy1256 Ordered   2  Folic Acid 1 MG Oral Tablet; TAKE 1 TABLET DAILY; Therapy: 86IHS6013 to (Emily Chopra)  Requested for: 48NAN4200; Last   IP:32ISM2657 Ordered   3  Methotrexate Sodium 25 MG/ML SOLN;   Therapy: (Recorded:63Yac0495) to Recorded   4  PredniSONE 10 MG Oral Tablet; take 5 mg daily; Therapy: 11LML4622 to (Evaluate:36Rqr1071)  Requested for: 00Saw1967; Last   Rx:25Sxk6173 Ordered   5  Singulair 10 MG Oral Tablet (Montelukast Sodium); Therapy: (Recorded:25Gep0502) to Recorded   6  Vitamin D 2000 UNIT Oral Capsule; take one capsule daily by mouth; Therapy: 58YVR3514 to (Evaluate:42Ltw7960); Last Rx:28Xbv7203 Ordered    Allergies    1   Amoxicillin TABS    Signatures   Electronically signed by : Hussein Ulloa, ; Jul 25 2016  4:01PM EST                       (Author)

## 2018-01-13 NOTE — MISCELLANEOUS
Message   Recorded as Task   Date: 10/11/2016 01:37 PM, Created By: Kostas Bentley   Task Name: Medical Complaint Callback   Assigned To: Yolanda Jain   Regarding Patient: Isamar Bartlett, Status: Active   CommentBernerd Jolynn - 11 Oct 2016 1:37 PM     TASK CREATED  mom aware labs normal and scripts sent for next 3 months  script sent for MTX to mail order        Active Problems    1  Crohn's disease of small intestine without complication (411 9) (X21 18)   2  Gastroesophageal reflux disease with esophagitis (530 11) (K21 0)   3  Vitamin D deficiency (268 9) (E55 9)    Current Meds   1  Famotidine 20 MG Oral Tablet; take 1 tablet by mouth twice a day; Therapy: 76Iry1317 to (Evaluate:66Noe6235)  Requested for: 95Lsl8272; Last   Rx:94Lsr1416; Status: [address]  to Pharmacy - Awaiting Verification   Ordered   2  Folic Acid 1 MG Oral Tablet; TAKE 1 TABLET DAILY; Therapy: 93EXG3280 to (Evaluate:37Jfk1348)  Requested for: 43Kbe9303; Last   Rx:00Qvh1551 Ordered   3  Methotrexate 2 5 MG Oral Tablet; TAKE 10 TABLETS WEEKLY; Therapy: 84XTJ5676 to (Evaluate:20Esj1630)  Requested for: 92KTF6690; Last   Rx:11Oct2016 Ordered   4  Singulair 10 MG Oral Tablet (Montelukast Sodium); Therapy: (Recorded:98Okx7740) to Recorded    Allergies    1   Amoxicillin TABS    Signatures   Electronically signed by : Joseluis Barnes, ; Oct 11 2016  4:50PM EST                       (Author)

## 2018-01-13 NOTE — RESULT NOTES
Message   Task to Samaria Prajapati   Upper biopsies are normal--fungal stains are pending  Colon biopsies show lymphoid aggregates but no acute or chronic colitis  Only abnormalities are small bowel  Verified Results  (1) TISSUE EXAM [id number] 10:18AM MadieRoland low    normal     Test Name Result Flag Reference   LAB AP CASE REPORT (Report)     Surgical Pathology Report             Case: [id number]                   Authorizing Provider: Hector Kinney MD     Collected:      03/08/2016 1018        Ordering Location:   [address] [place] [address]   Received:      03/09/2016 [address] [facility]                               Pathologist:      Louise Wilkins MD                                Specimens:  A) - Duodenum, duodenum biopsy                                     B) - Stomach, antrum biopsy                                      C) - Esophagus, esophagus biopsy                                    D) - Colon, right ascending biopsy                                   E) - Colon, transverse biopsy                                     F) - Colon, descending colon biopsy                                  G) - Colon, sigmoid biopsy   LAB AP FINAL DIAGNOSIS (Report)     A  Duodenum, biopsy:  -Benign small intestinal mucosa with retained villous architecture and no   evidence of increased intraepithelial lymphocytes  -Mild non-specific chronic inflammation of lamina propria seen   -No evidence of dysplasia or malignancy  -Multiple deeper levels examined  B  Stomach, antrum biopsy:  -Benign gastric-oxyntoantral type mucosa with mild chronic inactive   gastritis and single lymphoid follicle formation   -No evidence of Paneth cell metaplasia seen   -No evidence of dysplasia or malignancy   -No H Pylori organisms identified on immunohistochemical stained slide     Control reacted appropriately    C  Esophagus, esophagus biopsy:  -Benign squamous epithelium with mild chronic inflammation and no   eosinophilic infiltrate   -No evidence of eosinophilic esophagitis   -No evidence of Goblet cell metaplasia/ Barrettï¾'s esophagus identified   -No evidence of dysplasia or malignancy  Fungal stains will be ordered and the result will be issued in the   supplemental report      D  Colon, right ascending biopsy:  -Benign colonic mucosa with intramucosal lymphoid aggregate   -No evidence of active colitis   -No evidence of lymphocytic or collagenous colitis   -No evidence of dysplasia or malignancy seen  E  Colon, transverse biopsy:  -Benign colonic mucosa with intramucosal lymphoid aggregate   -No evidence of active colitis   -No evidence of lymphocytic or collagenous colitis   -No evidence of dysplasia or malignancy seen  F  Colon, descending colon biopsy:  Benign colonic mucosa with no significant histopathological changes  No evidence of active colitis   -No evidence of lymphocytic or collagenous colitis   -No evidence of dysplasia or malignancy seen  G  Colon, sigmoid biopsy:  -Benign colonic mucosa with edema of lamina propria and single   intramucosal lymphoid aggregate   -No evidence of active colitis   -No evidence of lymphocytic colitis   -No evidence of dysplasia or malignancy seen  -Trichrome stains will be ordered and the result will be issued in the   supplemental report   LAB AP NOTE      Interpretation performed at [facility], [address] [place] [address]   [address]    [facility] (Report)     These tests were developed and their performance characteristics   determined by [address] [place] [address] [facility] or [facility]  They may not be cleared or approved by the [place]  Food and   Drug Administration  The FDA has determined that such clearance or   approval is not necessary  These tests are used for clinical purposes  They should not be regarded as investigational or for research   This   laboratory has been approved by CLIA 88, designated as a high-complexity laboratory and is qualified to perform these tests  LAB AP GROSS DESCRIPTION (Report)     A  The specimen is received in formalin, labeled with the patient's name   and hospital number, and is designated duodenum biopsy  The specimen   consists of a single tan soft tissue fragment measuring 0 4 times  Entirely submitted  One cassette  B  The specimen is received in formalin, labeled with the patient's name   and hospital number, and is designated antrum biopsy  The specimen   consists of a single tan soft tissue fragment measuring 0 6 and there's  Entirely submitted  One cassette  C  The specimen is received in formalin, labeled with the patient's name   and hospital number, and is designated esophagus biopsy  The specimen   consists of a single white to tan soft tissue fragment measuring 0 4 cm  Entirely submitted  One cassette  Note: The estimated total formalin fixation time based upon information   provided by the submitting clinician and the standard processing schedule   is 27 75 hours  D  The specimen is received in formalin, labeled with the patient's name   and hospital number, and is designated right/ascending colon  The   specimen consists of 3 tan soft tissue fragments each measuring 0 5-0 6   cm  Entirely submitted  One cassette  Note: The estimated total formalin fixation time based upon information   provided by the submitting clinician and the standard processing schedule   is 27 5 hours  E  The specimen is received in formalin, labeled with the patient's name   and hospital number, and is designated transverse colon biopsy  The   specimen consists of a single tan soft tissue fragment measuring 0 5 cm  Entirely submitted  One cassette  F  The specimen is received in formalin, labeled with the patient's name   and hospital number, and is designated descending colon biopsy  The   specimen consists of 2 tan soft tissue fragments each measuring 0 6-0 7   cm   Entirely submitted  One cassette  G  The specimen is received in formalin, labeled with the patient's name   and hospital number, and is designated sigmoid biopsy  The specimen   consists of several tan soft tissue fragments measuring in aggregate 0 8 x   0 2 x 0 1 cm  Entirely submitted  One cassette  Note: The estimated total formalin fixation time based upon information   provided by the submitting clinician and the standard processing schedule   is 27 25 hours      MAC   LAB AP CLINICAL INFORMATION      Sliding hiatal hernia, Eosinophilic esophagitis, Scattered aphthous lesions       Signatures   Electronically signed by : MICHAEL Moran ; Mar 14 2016  1:31PM EST                       (Author)

## 2018-01-13 NOTE — MISCELLANEOUS
Message   Recorded as Task   Date: 08/25/2016 04:30 PM, Created By: Fina Lopes   Task Name: Call Patient with results   Assigned To: Bharathi Bearden   Regarding Patient: Dora Marsh, Status: Active   Comment:    Bharathi Bearden - 25 Aug 2016 4:30 PM     Patient Phone: [phone]      CRP normal   Bharathi Bearden - 25 Aug 2016 4:31 PM     ESR ok   Bharathi Bearden - 25 Aug 2016 4:31 PM     CMP normal   Bharathi Bearden - 25 Aug 2016 4:31 PM     CBC ok   Alejandra Amaya - 26 Aug 2016 12:37 PM     TASK EDITED         notified of results        Active Problems    1  Crohn's disease of small intestine without complication (980 8) (B37 19)   2  Gastroesophageal reflux disease with esophagitis (530 11) (K21 0)   3  Vitamin D deficiency (268 9) (E55 9)    Current Meds   1  Famotidine 20 MG Oral Tablet; take 1 tablet by mouth twice a day; Therapy: 50Yzm7886 to (Evaluate:84Hzr9769)  Requested for: 66Uom5072; Last   Rx:61Keo3013; Status: [address]  to Pharmacy - Awaiting Verification   Ordered   2  Folic Acid 1 MG Oral Tablet; TAKE 1 TABLET DAILY; Therapy: 66TFZ9287 to (Velia Roles)  Requested for: 58Zro8811; Last   Rx:90Gwb0349 Ordered   3  Methotrexate 2 5 MG Oral Tablet; TAKE 10 TABLETS WEEKLY; Therapy: 78TSQ6067 to (Sarah Estimable)  Requested for: 81Cob0795; Last   Rx:39Yrl7960 Ordered   4  Singulair 10 MG Oral Tablet (Montelukast Sodium); Therapy: (Recorded:37Ynm2720) to Recorded    Allergies    1   Amoxicillin TABS    Signatures   Electronically signed by : Nam Horan, ; Aug 26 2016 12:37PM EST                       (Author)

## 2018-01-14 NOTE — MISCELLANEOUS
Message   Recorded as Task   Date: 09/26/2016 12:02 PM, Created By: Romana Ferrara   Task Name: Follow Up   Assigned To: Yolanda Jain   Regarding Patient: Mayra Monterroso, Status: Active   Comment:    Yolanda Jain - 26 Sep 2016 12:02 PM     TASK CREATED  email lab slips to mom at [email or web address]   Yolanda Jain - 27 Sep 2016 2:13 PM     TASK EDITED     lab slips emailed to mom        Active Problems    1  Crohn's disease of small intestine without complication (367 7) (K15 23)   2  Gastroesophageal reflux disease with esophagitis (530 11) (K21 0)   3  Vitamin D deficiency (268 9) (E55 9)    Current Meds   1  Famotidine 20 MG Oral Tablet; take 1 tablet by mouth twice a day; Therapy: 05Hnj9381 to (Evaluate:44Ots7425)  Requested for: 01Rzj2124; Last   Rx:43Kcb1039; Status: [address]  to Pharmacy - Awaiting Verification   Ordered   2  Folic Acid 1 MG Oral Tablet; TAKE 1 TABLET DAILY; Therapy: 37CFO8644 to (Evaluate:14Shz9671)  Requested for: 57Cge5869; Last   Rx:31Ayn9089 Ordered   3  Methotrexate 2 5 MG Oral Tablet; TAKE 10 TABLETS WEEKLY; Therapy: 86YDS4038 to (Philly Harness)  Requested for: 12Mzw8661; Last   Rx:44Emx1299 Ordered   4  Singulair 10 MG Oral Tablet (Montelukast Sodium); Therapy: (Recorded:97Hrg6420) to Recorded    Allergies    1   Amoxicillin TABS    Signatures   Electronically signed by : Papa Crocker, ; Sep 27 2016  2:13PM EST                       (Author)

## 2018-01-15 NOTE — MISCELLANEOUS
Message   Recorded as Task   Date: 12/22/2016 09:45 AM, Created By: Gissell Reed   Task Name: Med Renewal Request   Assigned To: Yolanda Jain   Regarding Patient: Rasta Bella, Status: Active   CommentFrank Cait - 22 Dec 2016 9:45 AM     TASK CREATED  Caller: Von Cai, Mother; Renew Medication; [phone] (Day)  Patient came from college and forgot his medication  Mom request med send to pharmacy [facility] [place] for famotidine, folic acid and methotrexate  Mom will call insurance to check if they will cover since Rosalee Mcclain just got refill from pharmacy  Yolanda Jain - 22 Dec 2016 10:48 AM     TASK EDITED  meds sent to pharm,acy        Active Problems    1  Crohn's disease of small intestine without complication (632 0) (N82 04)   2  Gastroesophageal reflux disease with esophagitis (530 11) (K21 0)   3  Vitamin D deficiency (268 9) (E55 9)    Current Meds   1  Famotidine 20 MG Oral Tablet; take 1 tablet by mouth twice a day; Therapy: 74Tja2573 to (Evaluate:00Lbn5212)  Requested for: 69Jyq0216; Last   Rx:66Vkr7412; Status: [address] to Pharmacy - Awaiting Verification   Ordered   2  Folic Acid 1 MG Oral Tablet; TAKE 1 TABLET DAILY; Therapy: 70HVG6300 to (Evaluate:45Jdr5586)  Requested for: 89Qso6884; Last   Rx:57Fqz7134 Ordered   3  Methotrexate 2 5 MG Oral Tablet; TAKE 10 TABLETS WEEKLY; Therapy: 14OUM0167 to (Evaluate:14Mar2017)  Requested for: 60JSQ3691; Last   Rx:14Nov2016 Ordered   4  Singulair 10 MG Oral Tablet (Montelukast Sodium); Therapy: (Recorded:38Yke4331) to Recorded    Allergies    1   Amoxicillin TABS    Plan  Crohn's disease of small intestine without complication    · Folic Acid 1 MG Oral Tablet; TAKE 1 TABLET DAILY   · Methotrexate 2 5 MG Oral Tablet; TAKE 10 TABLETS WEEKLY  Crohn's disease of small intestine without complication, Gastroesophageal reflux disease  with esophagitis    · Famotidine 20 MG Oral Tablet; take 1 tablet by mouth twice a day    Signatures Electronically signed by : Hussein Ulloa, ; Dec 22 2016 10:48AM EST                       (Author)

## 2018-01-15 NOTE — MISCELLANEOUS
Message   Recorded as Task   Date: 05/26/2017 09:31 AM, Created By: Inna Reed   Task Name: Call Patient with results   Assigned To: Yolanda Jain   Regarding Patient: Isabel Hughes, Status: Active   Comment:    JoséTara saeedo - 26 May 2017 9:31 AM     Patient Phone: [phone]      Consistent with recent EBV   HeTara saeedo - 26 May 2017 9:32 AM     ESR mildly elevated   HeBharathi saeed - 26 May 2017 9:33 AM     CRP mildly elevated   HeTara saeedo - 26 May 2017 9:33 AM     CBC consistent with EBV repeat in 2 weeks   HehBarathi saeed - 26 May 2017 9:34 AM     mild elevation of amino transferases consistent with EBV   Yolanda Jain - 26 May 2017 9:36 AM     TASK REASSIGNED: Previously Assigned To Bharathi Bearden Cynthia - 26 May 2017 10:06 AM     TASK REASSIGNED: Previously Assigned To Yolanda Jain  mom aware of lab results and script for cbc repeat sent to mom  mom instructed to get labs on 6/7        Active Problems    1  Abnormal CBC (790 6) (R79 89)   2  Crohn's disease of small intestine without complication (740 2) (Y08 94)   3  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)   4  Gastroesophageal reflux disease with esophagitis (530 11) (K21 0)   5  Vitamin D deficiency (268 9) (E55 9)    Current Meds   1  Famotidine 20 MG Oral Tablet; Take one tablet every 12 hours as needed for abdominal   pain; Therapy: 38Zeu7244 to (Evaluate:28Mar2017)  Requested for: 41Zdl6056; Last   Rx:30Nhu6483 Ordered   2  Folic Acid 1 MG Oral Tablet; TAKE 1 TABLET DAILY; Therapy: 76LQZ1532 to (Laura Copier)  Requested for: 51Rhy2796; Last   Rx:36Tst9825 Ordered   3  Methotrexate 2 5 MG Oral Tablet; TAKE 10 TABLETS WEEKLY; Therapy: 60HLQ3633 to (Laura Copier)  Requested for: 07Izm0205; Last   Rx:64Vpr3887 Ordered   4  Singulair 10 MG Oral Tablet (Montelukast Sodium); Therapy: (Recorded:90Aze8479) to Recorded    Allergies    1   Amoxicillin TABS    Plan  Abnormal CBC, Crohn's disease of small intestine without complication    · (1) CBC/PLT/DIFF; Status:Active - Retrospective Authorization;  Requested  for:65Bde1876;     Signatures   Electronically signed by : Hussein Ulloa, ; May 26 2017 10:06AM EST                       (Author)

## 2018-01-15 NOTE — MISCELLANEOUS
Message   Recorded as Task   Date: 05/14/2016 06:05 PM, Created By: Yamileth Barrow   Task Name: Call Patient with results   Assigned To: Yolanda Jain   Regarding Patient: Lino Meckel, Status: Active   Comment:    Bharathi Bearden - 14 May 2016 6:05 PM     Patient Phone: [phone]      Task to Jennifer, CRP 21, elevated   JoséTara saeedo - 14 May 2016 6:05 PM     ESR 20, mild elevation   Bharathi Bearden - 14 May 2016 6:05 PM     CMP normal   SonamBharathi betts - 14 May 2016 6:06 PM     CBC mild WBC elevation   Judit Parsons - 16 May 2016 9:17 AM     TASK REASSIGNED: Previously Assigned To Bharathi Bearden Cynthia - 16 May 2016 1:04 PM     TASK EDITED  MOMHONORIO OF LAB RESULTS        Active Problems    1  Crohn's disease of small intestine without complication (855 2) (C80 66)   2  Gastroesophageal reflux disease with esophagitis (530 11) (K21 0)   3  Vitamin D deficiency (268 9) (E55 9)    Current Meds   1  Folic Acid 1 MG Oral Tablet; TAKE 1 TABLET DAILY; Therapy: 02OHI8682 to (Gianni Feng)  Requested for: 03HXL9659; Last   TX:83YOO9923 Ordered   2  NexIUM 40 MG Oral Capsule Delayed Release (Esomeprazole Magnesium); Therapy: (Recorded:12Hpp0681) to Recorded   3  PredniSONE 10 MG Oral Tablet; TAKE 35 MG Daily; Therapy: 12SPE7238 to (Gianni Feng)  Requested for: 75HQP7268; Last   KD:29WYT8635 Ordered   4  Singulair 10 MG Oral Tablet (Montelukast Sodium); Therapy: (Recorded:44Dcb0999) to Recorded   5  Vitamin D 2000 UNIT Oral Capsule; take one capsule daily by mouth; Therapy: 25VVH2910 to (Evaluate:45Agz4307); Last Rx:90Grk1799 Ordered    Allergies    1   Amoxicillin TABS    Signatures   Electronically signed by : Rochelle Curran, ; May 16 2016  1:05PM EST                       (Author)

## 2018-01-15 NOTE — MISCELLANEOUS
Message   Recorded as Task   Date: 11/14/2016 09:43 AM, Created By: Velia Vela   Task Name: Med Renewal Request   Assigned To: Yolanda Jain   Regarding Patient: Lorne Garcia, Status: Active   Comment:    Velia Vela - 14 Nov 2016 9:43 AM     TASK CREATED  Caller: adalgisa, Mother; Renew Medication; [phone]  pt needs refill on methotrexate 2 5mg take 10 tablets weekly  pt has f/u 12/28/2016 with Dr Sarah Olguin - 14 Nov 2016 10:03 AM     TASK EDITED      med sent to pharmacy        Active Problems    1  Crohn's disease of small intestine without complication (228 7) (Y54 85)   2  Gastroesophageal reflux disease with esophagitis (530 11) (K21 0)   3  Vitamin D deficiency (268 9) (E55 9)    Current Meds   1  Famotidine 20 MG Oral Tablet; take 1 tablet by mouth twice a day; Therapy: 99Vwv7013 to (Evaluate:42Rpd7970)  Requested for: 24Ppe6843; Last   Rx:12Krx0033; Status: [address] to Pharmacy - Awaiting Verification   Ordered   2  Folic Acid 1 MG Oral Tablet; TAKE 1 TABLET DAILY; Therapy: 93OTD4769 to (Evaluate:06Bqx4289)  Requested for: 67Att8914; Last   Rx:46Qhq0314 Ordered   3  Methotrexate 2 5 MG Oral Tablet; TAKE 10 TABLETS WEEKLY; Therapy: 92HIR2360 to (Evaluate:35Oau7927)  Requested for: 44YPF6165; Last   Rx:46Bob0262 Ordered   4  Singulair 10 MG Oral Tablet (Montelukast Sodium); Therapy: (Recorded:61Mxy1622) to Recorded    Allergies    1   Amoxicillin TABS    Plan  Crohn's disease of small intestine without complication    · Methotrexate 2 5 MG Oral Tablet; TAKE 10 TABLETS WEEKLY    Signatures   Electronically signed by : Contreras Sheehan, ; Nov 14 2016 10:03AM EST                       (Author)

## 2018-01-15 NOTE — MISCELLANEOUS
Message   Recorded as Task   Date: 05/31/2016 11:04 AM, Created By: Jacquie Cerda   Task Name: Medical Complaint Callback   Assigned To: Yolanda Jain   Regarding Patient: Lino Meckel, Status: Active   CommentDawson Pabloalpesh - 31 May 2016 11:04 AM     TASK CREATED  Medical Complaint; [phone]  Ricky's mother called with an update  Claire Jacobsen is doing well at 25mg of prednisone  Yolanda Jain - 31 May 2016 3:51 PM     TASK EDITED  left message for mom to return call   Yolanda Jain - 01 Jun 2016 1:47 PM     TASK EDITED  INSTRUCTED MOM TO DECREASE PREDNISONE TO 20 MG AND CALL IN ONE WEEK WITH UPDATE  Active Problems    1  Crohn's disease of small intestine without complication (789 2) (L81 57)   2  Gastroesophageal reflux disease with esophagitis (530 11) (K21 0)   3  Vitamin D deficiency (268 9) (E55 9)    Current Meds   1  Folic Acid 1 MG Oral Tablet; TAKE 1 TABLET DAILY; Therapy: 33MEZ6401 to (Gianni Feng)  Requested for: 61GVM0353; Last   IK:97KBI5392 Ordered   2  NexIUM 40 MG Oral Capsule Delayed Release (Esomeprazole Magnesium); Therapy: (Recorded:35Wsb4584) to Recorded   3  PredniSONE 10 MG Oral Tablet; TAKE 25 MG Daily; Therapy: 22WIZ3094 to (Evaluate:95Huq1351)  Requested for: 88WRL1513; Last   Rx:26Mzl3407 Ordered   4  Singulair 10 MG Oral Tablet (Montelukast Sodium); Therapy: (Recorded:85Ikc1900) to Recorded   5  Vitamin D 2000 UNIT Oral Capsule; take one capsule daily by mouth; Therapy: 96ZQR1847 to (Evaluate:11Jun2016); Last Rx:11Lzs9791 Ordered    Allergies    1   Amoxicillin TABS    Plan  Crohn's disease of small intestine without complication    · From  PredniSONE 10 MG Oral Tablet TAKE 25 MG Daily To PredniSONE 10  MG Oral Tablet TAKE 2 TABLETS DAILY    Signatures   Electronically signed by : Rochelle Curran, ; Jun 1 2016  1:47PM EST                       (Author)

## 2018-01-15 NOTE — MISCELLANEOUS
Message   Recorded as Task   Date: 02/12/2016 12:55 PM, Created By: Jose Alejandro Gaviria   Task Name: Follow Up   Assigned To: Yolanda Jain   Regarding Patient: Max Desir, Status: Active   Comment:    Bharathi Bearden - 12 Feb 2016 12:55 PM     TASK CREATED  ESR 23, CRP 33, B12 normal, Vit D 23--low,  Hgb 11 6, Plt 750 K, pANCA neg, S cervisae IgA and IgG are both positive  1   Labs and H&P are very suggestive of Crohn's--awaiting MRE and Calprotectin  2  Vit D is low--needs 2000 IU per day  Yolanda Jain - 12 Feb 2016 1:26 PM     TASK EDITED  mom aware of lab results and she is aware to start 2000 IU vitamin D daily and she will call back with date for MRE   Yolanda Jain - 12 Feb 2016 1:43 PM     TASK EDITED  mom states the MRE is on 2/29 at 11 am in [place] - 12 Feb 2016 5:29 PM     TASK EDITED  info faxed to [facility]        Active Problems    1  Abdominal pain, periumbilical (482 57) (M79 05)   2  Abnormal weight loss (783 21) (R63 4)   3  Vitamin D deficiency (268 9) (E55 9)    Current Meds   1  NexIUM 40 MG Oral Capsule Delayed Release (Esomeprazole Magnesium); Therapy: (Recorded:59Ide5105) to Recorded   2  Singulair 10 MG Oral Tablet (Montelukast Sodium); Therapy: (Recorded:78Apv3921) to Recorded   3  Vitamin D 2000 UNIT Oral Capsule; take one capsule daily by mouth; Therapy: 29KWE6359 to (Evaluate:11Jun2016); Last Rx:63Qik1891 Ordered    Allergies    1   Amoxicillin TABS    Signatures   Electronically signed by : Astrid Parrish, ; Feb 12 2016  5:29PM EST                       (Author)

## 2018-01-15 NOTE — MISCELLANEOUS
Message   Recorded as Task   Date: 05/16/2016 11:38 AM, Created By: Ant Merida   Task Name: Medical Complaint Callback   Assigned To: Yolanda Jain   Regarding Patient: Lorne Garcia, Status: Active   CommentBroadus Co - 16 May 2016 11:38 AM     TASK CREATED  Caller: Mom, Mother; Medical Complaint; [phone] (Day)  Mom left a message with information about have a questions regarding Wynell Aquas treatment plan  Mom request to speak with nurse  Yolanda Jain - 16 May 2016 2:17 PM     TASK EDITED  MOM STATES HE HAD HIS FIRST MTX INJECTION ON FRI MAY 13TH  SHE ALSO ASKED ABOUT DECREASING HIS PREDNISONE  SHE WAS NOT SURE OF THE PLAN BUT WAS READING THE DISCHARGE SUMMARY AND SAW THAT IT SAID TO DECREASE WEEKLY  EXPLAINED TO MOM THAT WE TAPER BY 5 MG WEEKLY AND SHE NEEDS TO CALL TO LET US KNOW HOW HE IS DOING BEFORE WE DECIDE TO TAPER OR NOT  HE WAS DECREASED TO 35 MG AT HIS MAY 4TH VISIT  INSTRUCTED HER TO DECREASE TO 30 TOMORROW AND TO CALL NEXT MONDAY WITH AN UPDATE  SHE ALSO QUESTIONED ABOUT HIM GIVING THE MTX INJECTIONS HIMSELF WHEN AT [facility]LE[facility]  INSTRUCTED HER THAT SHE NEEDED TO CONTACT THE SCHOOL AND SEE IF HE CAN GO TO THE [facility] TO RECEIVE THE INJECTIONS  MOM ALSO QUESTIONED ABOUT MISSING 2 DOSES WHEN THEY GO ON VACATION IN JUNE  THEY WILL BE GONE FOR TWO WEEKS  LOOKS LIKE HE CAN GET ONE DAY BEFORE THEY LEAVE BUT THEN HE WILL MISS 2 DOSES  WILL THIS BE OK  Bharathi Bearden - 16 May 2016 2:44 PM     TASK REPLIED TO: Previously Assigned To Bharathi Bearden  He needs to be on steroids (>20 mg/day) until they come back from being out of town  OK to continue to decrease  weekly until he reached 20 mg/d  Will resume decrease after they get back  Agree she needs to call school  Where is he going?    Yolanda Jain - 16 May 2016 3:58 PM     TASK EDITED  so once we get down to 20 stay there until after vacation which is towards the end of june going into july, or do we keep going down and just do the 20 while away? he is going to [place] - 16 May 2016 5:04 PM     TASK REPLIED TO: Previously Assigned To SuleimanBharathi  go to 20 and hold at that dose until he comes back  Siria is on [place]--they should be able to identify someone who can give med on Clifton Busch - 16 May 2016 5:35 PM     TASK EDITED  mom aware of plan  Active Problems    1  Crohn's disease of small intestine without complication (022 2) (O99 68)   2  Gastroesophageal reflux disease with esophagitis (530 11) (K21 0)   3  Vitamin D deficiency (268 9) (E55 9)    Current Meds   1  Folic Acid 1 MG Oral Tablet; TAKE 1 TABLET DAILY; Therapy: 19KBL9582 to (Tellis Pellet)  Requested for: 56ZAY3922; Last   UM:78PMZ4178 Ordered   2  NexIUM 40 MG Oral Capsule Delayed Release (Esomeprazole Magnesium); Therapy: (Recorded:34Vyz0103) to Recorded   3  PredniSONE 10 MG Oral Tablet; TAKE 35 MG Daily; Therapy: 97GSC8839 to (Tellis Pellet)  Requested for: 91XSG4017; Last   RB:28QYG6192 Ordered   4  Singulair 10 MG Oral Tablet (Montelukast Sodium); Therapy: (Recorded:25Ylu7445) to Recorded   5  Vitamin D 2000 UNIT Oral Capsule; take one capsule daily by mouth; Therapy: 98ERT3545 to (Evaluate:69Pom6142); Last Rx:71Oir8859 Ordered    Allergies    1   Amoxicillin TABS    Signatures   Electronically signed by : Viky Chan, ; May 16 2016  5:36PM EST                       (Author)

## 2018-01-16 NOTE — MISCELLANEOUS
Message   Recorded as Task   Date: 02/29/2016 04:44 PM, Created By: Jessica Crum   Task Name: Call Patient with results   Assigned To: Yolanda Jain   Regarding Patient: Brodie Garay, Status: Active   Comment:    Bharathi Bearden - 29 Feb 2016 4:44 PM     Patient Phone: [phone]      Task to [facility]  Distal ileum abnormal, most conistent with Francia Bruce - 29 Feb 2016 4:47 PM     TASK REASSIGNED: Previously Assigned To [facility] - 01 Mar 2016 4:23 PM     TASK EDITED  mom aware of MRE results and reviewed EGD and COLON  info for next week  will send instructions  all instructions given over the phone        Active Problems    1  Abdominal pain, periumbilical (463 51) (S61 26)   2  Abnormal weight loss (783 21) (R63 4)   3  Vitamin D deficiency (268 9) (E55 9)    Current Meds   1  NexIUM 40 MG Oral Capsule Delayed Release (Esomeprazole Magnesium); Therapy: (Recorded:94Pio5100) to Recorded   2  Singulair 10 MG Oral Tablet (Montelukast Sodium); Therapy: (Recorded:49Obo7114) to Recorded   3  Vitamin D 2000 UNIT Oral Capsule; take one capsule daily by mouth; Therapy: 93CCR4811 to (Evaluate:11Jun2016); Last Rx:38Wui4620 Ordered    Allergies    1   Amoxicillin TABS    Signatures   Electronically signed by : Abbi Pickett, ; Mar  1 2016  5:13PM EST                       (Author)

## 2018-01-16 NOTE — MISCELLANEOUS
Message   Recorded as Task   Date: 2016 06:10 PM, Created By: Masha Hughes   Task Name: Care Coordination   Assigned To: Yolanda Jain   Regarding Patient: Telly Briones, Status: Active   CommentHeide Amber - 04 May 2016 6:10 PM     TASK CREATED  need to start process for MTX injections   Yolanda Jain - 04 May 2016 6:10 PM     TASK EDITED  mtx injections at [facility]   Masha Hughes - 05 May 2016 2:49 PM     TASK EDITED  per insurance no prior auth needed  will contact [facility] regarding  date for first injection   Yolanda Jain - 05 May 2016 4:19 PM     TASK EDITED  he wants to do  at 3 p   Yolanda Jain - 06 May 2016 10:46 AM     TASK EDITED  spoke to [place] at [facility] and set up Ricky's first MTX infusion for  at 3 pm and he will get a set of labs prior and then monthly   Yolanda Jain - 06 May 2016 11:21 AM     TASK EDITED  left message for mom regarding date and time for first mtx injections  Yolanda Jain - 06 May 2016 11:32 AM     TASK EDITED  orders to be faxed to [facility]        Active Problems    1  Crohn's disease of small intestine without complication (175 8) (I78 52)   2  Gastroesophageal reflux disease with esophagitis (530 11) (K21 0)   3  Vitamin D deficiency (268 9) (E55 9)    Current Meds   1  Folic Acid 1 MG Oral Tablet; TAKE 1 TABLET DAILY; Therapy: 32RPX7007 to (Louise Tidwell)  Requested for: 01PZU5292; Last   KL:17IEP3513 Ordered   2  NexIUM 40 MG Oral Capsule Delayed Release (Esomeprazole Magnesium); Therapy: (Recorded:00Sak1904) to Recorded   3  PredniSONE 10 MG Oral Tablet; TAKE 35 MG Daily; Therapy: 11EOW5516 to (Louise Tidwell)  Requested for: 33VST9378; Last   H99RFN1266 Ordered   4  Singulair 10 MG Oral Tablet (Montelukast Sodium); Therapy: (Recorded:33Gfo0453) to Recorded   5  Vitamin D 2000 UNIT Oral Capsule; take one capsule daily by mouth;    Therapy: 96ONF4542 to (Evaluate:11Jun2016); Last Rx:38Tly2453 Ordered    Allergies    1   Amoxicillin TABS    Signatures   Electronically signed by : Rebecca Benavides, ; May  6 2016 11:32AM EST                       (Author)

## 2018-01-16 NOTE — MISCELLANEOUS
Message   Recorded as Task   Date: 06/20/2016 08:30 AM, Created By: Chadwick Sheikh   Task Name: Call Patient with results   Assigned To: Yolanda Jain   Regarding Patient: Allyson Mallory, Status: Active   Comment:    Bharathi Bearden - 20 Jun 2016 8:30 AM     Patient Phone: [phone]      Task to Jennifer  ESR 13, improved   Bharathi Bearden - 20 Jun 2016 8:31 AM     CRP 10 5 improved   Bharathi Bearden - 20 Jun 2016 8:31 AM     CMP normal   Bharathi Bearden - 20 Jun 2016 8:31 AM     CBC ok except mild WBC and platelet elevation--consistent with the ESR and CRP   Stephanie Rowell - 20 Jun 2016 9:36 AM     TASK REASSIGNED: Previously Assigned To Bharathi Bearden Cynthia - 20 Jun 2016 11:19 AM     TASK EDITED  left message for mom regarding lab results        Active Problems    1  Crohn's disease of small intestine without complication (598 3) (Y17 40)   2  Gastroesophageal reflux disease with esophagitis (530 11) (K21 0)   3  Vitamin D deficiency (268 9) (E55 9)    Current Meds   1  Folic Acid 1 MG Oral Tablet; TAKE 1 TABLET DAILY; Therapy: 34EKG4686 to (Leo Lares)  Requested for: 14VOJ1705; Last   GP:39PQD1505 Ordered   2  NexIUM 40 MG Oral Capsule Delayed Release (Esomeprazole Magnesium); Therapy: (Recorded:37Lxp4987) to Recorded   3  PredniSONE 10 MG Oral Tablet; TAKE 2 TABLETS DAILY; Therapy: 80CFA6788 to (Evaluate:08Jun2016)  Requested for: 90ALS6472; Last   Rx:01Jun2016 Ordered   4  Singulair 10 MG Oral Tablet (Montelukast Sodium); Therapy: (Recorded:04Cwf2713) to Recorded   5  Vitamin D 2000 UNIT Oral Capsule; take one capsule daily by mouth; Therapy: 18UYJ8874 to (Evaluate:11Jun2016); Last Rx:45Oiz7436 Ordered    Allergies    1   Amoxicillin TABS    Signatures   Electronically signed by : Ariana Jeffrey, ; Jun 20 2016 11:19AM EST                       (Author)

## 2018-01-16 NOTE — RESULT NOTES
Message   CRP normal   ESR ok   CMP normal   CBC ok     Verified Results  (1) CBC/PLT/DIFF 67Apw8913 10:31AM Yani Bearden Order Number: [id number]     Test Name Result Flag Reference   WBC COUNT 6 20 Thousand/uL  4 31-10 16   WBC ADJUSTED 6 20 Thousand/uL  4 31-10 16   RBC COUNT 4 89 Million/uL  3 88-5 62   HEMOGLOBIN 12 9 g/dL  12 0-17 0   HEMATOCRIT 40 8 %  36 5-49 3   MCV 83 fL  82-98   MCH 26 4 pg L 26 8-34 3   MCHC 31 7 g/dL  31 4-37 4   RDW 18 4 % H 11 6-15 1   MPV 7 5 fL L 8 9-12 7   PLATELET COUNT 876 Thousands/uL  149-390   GRANULOCYTES - REL 66 9 %  47-80   LYMPHOCYTES RELATIVE PERCENT 28 %  14-44   MONOCYTES RELATIVE PERCENT 5 %  4-12   GRANS ABS 4 10 Thousand/?L  1 85-7 82   LYMPHOCYTES ABSOLUTE COUNT 1 70 Thousands/?L  0 60-4 47   MONOCYTES ABSOLUTE COUNT 0 30 Thousand/?L  0 17-1 22   - Patient Instructions: This bloodwork is non-fasting  Please drink two glasses of water morning of bloodwork  (1) COMPREHENSIVE METABOLIC PANEL 15XUG8521 28:07LL Yani Bearden Order Number: [id number]     Test Name Result Flag Reference   GLUCOSE,RANDM 91 mg/dL     If the patient is fasting, the ADA then defines impaired fasting glucose as > 100 mg/dL and diabetes as > or equal to 123 mg/dL     SODIUM 141 mmol/L  136-145   POTASSIUM 4 1 mmol/L  3 5-5 3   CHLORIDE 103 mmol/L  100-108   CARBON DIOXIDE 26 mmol/L  21-32   ANION GAP (CALC) 12 mmol/L  4-13   BLOOD UREA NITROGEN 15 mg/dL  5-25   CREATININE 0 84 mg/dL  0 60-1 30   Standardized to IDMS reference method   CALCIUM 9 5 mg/dL  8 3-10 1   BILI, TOTAL 0 30 mg/dL  0 20-1 00   ALK PHOSPHATAS 90 U/L     ALT (SGPT) <6 U/L L 12-78   AST(SGOT) 5 U/L  5-45   ALBUMIN 4 3 g/dL  3 5-5 0   TOTAL PROTEIN 7 4 g/dL  6 4-8 2   eGFR Non-African American      >60 0 ml/min/1 73sq m   [facility] [employer] Disease Education Program recommendations are as follows:  GFR calculation is accurate only with a steady state creatinine  Chronic Kidney disease less than 60 ml/min/1 73 sq  meters  Kidney failure less than 15 ml/min/1 73 sq  meters       (1) C-REACTIVE PROTEIN 98Omt0556 10:31AM Pati Bearden Order Number: [id number]     Test Name Result Flag Reference   C-REACT PROTEIN <3 0 mg/L  <3 0     (1) SED RATE 48Fmq2215 10:31AM Pati Bearden Order Number: [id number]     Test Name Result Flag Reference   SED RATE 2 mm/hour  0-10

## 2018-01-17 NOTE — RESULT NOTES
Verified Results  (1) CBC/PLT/DIFF 32KGR1964 02:02PM Jayesh Bearden Ready     Test Name Result Flag Reference   WBC COUNT 5 59 Thousand/uL  4 31-10 16   RBC COUNT 4 16 Million/uL  3 88-5 62   HEMOGLOBIN 12 5 g/dL  12 0-17 0   HEMATOCRIT 37 6 %  36 5-49 3   MCV 90 fL  82-98   MCH 30 0 pg  26 8-34 3   MCHC 33 2 g/dL  31 4-37 4   RDW 13 7 %  11 6-15 1   MPV 9 9 fL  8 9-12 7   PLATELET COUNT 765 Thousands/uL  149-390   nRBC AUTOMATED 0 /100 WBCs     NEUTROPHILS RELATIVE PERCENT 35 % L 43-75   LYMPHOCYTES RELATIVE PERCENT 48 % H 14-44   MONOCYTES RELATIVE PERCENT 15 % H 4-12   EOSINOPHILS RELATIVE PERCENT 1 %  0-6   BASOPHILS RELATIVE PERCENT 1 %  0-1   NEUTROPHILS ABSOLUTE COUNT 1 95 Thousands/? ??L  1 85-7 62   LYMPHOCYTES ABSOLUTE COUNT 2 72 Thousands/? ??L  0 60-4 47   MONOCYTES ABSOLUTE COUNT 0 83 Thousand/? ??L  0 17-1 22   EOSINOPHILS ABSOLUTE COUNT 0 05 Thousand/? ??L  0 00-0 61   BASOPHILS ABSOLUTE COUNT 0 03 Thousands/? ??L  0 00-0 10   - Patient Instructions: This bloodwork is non-fasting  Please drink two glasses of water morning of bloodwork

## 2018-01-18 NOTE — MISCELLANEOUS
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)   4  Gastroesophageal reflux disease with esophagitis (530 11) (K21 0)   5  Vitamin D deficiency (268 9) (E55 9)    Current Meds   1  Famotidine 20 MG Oral Tablet; Take one tablet every 12 hours as needed for abdominal   pain; Therapy: 64Ncx8322 to (Evaluate:28Mar2017)  Requested for: 95Pqu7416; Last   Rx:60Lak4278 Ordered   2  Folic Acid 1 MG Oral Tablet; TAKE 1 TABLET DAILY; Therapy: 13QJZ9250 to (Maranda Stephie)  Requested for: 18Els5903; Last   Rx:87Rky6200 Ordered   3  Methotrexate 2 5 MG Oral Tablet; TAKE 10 TABLETS WEEKLY; Therapy: 00QEI5046 to (Maranda Stephie)  Requested for: 26Cbw5364; Last   Rx:44Ylq8732 Ordered   4  Singulair 10 MG Oral Tablet (Montelukast Sodium); Therapy: (Recorded:33Hsp2857) to Recorded    Allergies    1   Amoxicillin TABS    Signatures   Electronically signed by : Maryellen Kincaid, ; May 26 2017  3:17PM EST                       (Author)

## 2018-01-18 NOTE — MISCELLANEOUS
Message   Recorded as Task   Date: 02/27/2017 01:56 PM, Created By: Hanh Banegas   Task Name: Medical Complaint Callback   Assigned To: Yolanda Jain   Regarding Patient: Felisha Ansari, Status: Active   CommentPryor Copa - 27 Feb 2017 1:56 PM     TASK CREATED  Caller: Mary Ann Son, Mother; Medical Complaint; [phone] (Day)  Mom left a message requesting script for March bw  I don't see any pending bw but mom said usually dr Monica Petty order bw every 3 months  Mom request those script been email to [email or web address]   Yolanda Jain - 27 Feb 2017 2:14 PM     TASK EDITED  LAB SLIPS EMAILED TO MOM  MOM AWARE  Active Problems    1  Crohn's disease of small intestine without complication (201 5) (B66 43)   2  Gastroesophageal reflux disease with esophagitis (530 11) (K21 0)   3  Vitamin D deficiency (268 9) (E55 9)    Current Meds   1  Famotidine 20 MG Oral Tablet; Take one tablet every 12 hours as needed for abdominal   pain; Therapy: 13Eug4378 to (Evaluate:28Mar2017)  Requested for: 99Pvn1591; Last   Rx:07Cfl0441 Ordered   2  Folic Acid 1 MG Oral Tablet; TAKE 1 TABLET DAILY; Therapy: 49TFO7232 to (Lucero Laboy)  Requested for: 05Mjc8505; Last   Rx:58Afw8938 Ordered   3  Methotrexate 2 5 MG Oral Tablet; TAKE 10 TABLETS WEEKLY; Therapy: 76KQR6640 to (Lucero Laboy)  Requested for: 60Vah8182; Last   Rx:21Slp4944 Ordered   4  Singulair 10 MG Oral Tablet (Montelukast Sodium); Therapy: (Recorded:93Lhe4248) to Recorded    Allergies    1  Amoxicillin TABS    Plan  Crohn's disease of small intestine without complication    · (1) CBC/PLT/DIFF; Status:Active - Retrospective Authorization; Requested  for:06Oay4060;    · (1) COMPREHENSIVE METABOLIC PANEL; Status:Active - Retrospective Authorization; Requested for:15Rff9361;    · (1) C-REACTIVE PROTEIN; Status:Active - Retrospective Authorization;  Requested  for:58Qmc2560;    · (1) SED RATE; Status:Active - Retrospective Authorization; Requested for:19Zwt6809;     Signatures   Electronically signed by : Maryellen Kincaid, ; Feb 27 2017  2:14PM EST                       (Author)

## 2018-01-18 NOTE — MISCELLANEOUS
Message   Recorded as Task   Date: 03/14/2016 01:31 PM, Created By: Dawit Moy   Task Name: Call Patient with results   Assigned To: Yolanda Jain   Regarding Patient: Felisha Ansari, Status: Active   Comment:    Bharathi Bearden - 14 Mar 2016 1:31 PM     Patient Phone: [phone]      Task to Jennifer  Upper biopsies are normal--fungal stains are pending  Colon biopsies show lymphoid aggregates but no acute or chronic colitis  Only abnormalities are small bowel  Hanh Banegas - 14 Mar 2016 1:32 PM     TASK REASSIGNED: Previously Assigned To Mann Triana - 14 Mar 2016 2:03 PM     TASK REASSIGNED: Previously Assigned To Yolanda Jain  he does not have a f/u  I know he was seen in [facility] but mom said she would come to [place] if need be  You have a 4 pm on Wed in [facility] and a 3 tomorrow here  nothing else this week  can we schedule him out further? This mom may have a lot of questions? Yolanda Jain - 14 Mar 2016 2:48 PM     TASK EDITED  mom aware of bx results and assured her that there is no cancer  scheduled f/u for 3/16 in [place]  Active Problems    1  Abdominal pain, periumbilical (943 20) (J73 99)   2  Abnormal weight loss (783 21) (R63 4)   3  Vitamin D deficiency (268 9) (E55 9)    Current Meds   1  NexIUM 40 MG Oral Capsule Delayed Release (Esomeprazole Magnesium); Therapy: (Recorded:52Mkd4945) to Recorded   2  Singulair 10 MG Oral Tablet (Montelukast Sodium); Therapy: (Recorded:50Tbd6424) to Recorded   3  Vitamin D 2000 UNIT Oral Capsule; take one capsule daily by mouth; Therapy: 93XQO2086 to (Evaluate:11Jun2016); Last Rx:95Ezq5832 Ordered    Allergies    1   Amoxicillin TABS    Signatures   Electronically signed by : Luis Hector, ; Mar 14 2016  2:48PM EST                       (Author)

## 2018-01-18 NOTE — MISCELLANEOUS
Message   Recorded as Task   Date: 11/07/2016 12:15 PM, Created By: Antolin Adamson   Task Name: Call Back   Assigned To: Yolanda Jain   Regarding Patient: Felisha Ansari, Status: Active   CommentFernanda Woods - 07 Nov 2016 12:15 PM     TASK CREATED  Mom is calling today because she would like to know if Ricky's bloodwork came back normal  Please review   Antolin Adamson - 07 Nov 2016 2:42 PM     TASK REASSIGNED: Previously Assigned To [address] Kadi Kimbrough - 08 Nov 2016 5:13 PM     TASK REASSIGNED: Previously Assigned To Rob Camp 54 look ok   Yolanda Jain - 08 Nov 2016 5:17 PM     TASK EDITED       mom aware labs ae fine        Active Problems    1  Crohn's disease of small intestine without complication (780 8) (D29 40)   2  Gastroesophageal reflux disease with esophagitis (530 11) (K21 0)   3  Vitamin D deficiency (268 9) (E55 9)    Current Meds   1  Famotidine 20 MG Oral Tablet; take 1 tablet by mouth twice a day; Therapy: 61Ywo4640 to (Evaluate:84Veu5791)  Requested for: 28Sde1089; Last   Rx:64Qoj0806; Status: [address] to Pharmacy - Awaiting Verification   Ordered   2  Folic Acid 1 MG Oral Tablet; TAKE 1 TABLET DAILY; Therapy: 84SHL7435 to (Evaluate:78Iwt8535)  Requested for: 99Awr8874; Last   Rx:31Mgp9025 Ordered   3  Methotrexate 2 5 MG Oral Tablet; TAKE 10 TABLETS WEEKLY; Therapy: 97SND5929 to (Evaluate:09Mpm6987)  Requested for: 63HFI0101; Last   Rx:11Oct2016 Ordered   4  Singulair 10 MG Oral Tablet (Montelukast Sodium); Therapy: (Recorded:38Rxy3991) to Recorded    Allergies    1   Amoxicillin TABS    Signatures   Electronically signed by : Luis Hector, ; Nov 8 2016  5:17PM EST                       (Author)

## 2018-01-18 NOTE — MISCELLANEOUS
Message   Recorded as Task   Date: 03/14/2016 01:31 PM, Created By: Mariam Hinson   Task Name: Call Patient with results   Assigned To: Yolanda Jain   Regarding Patient: Mayra Monterroso, Status: Active   Comment:    Bharathi Bearden - 14 Mar 2016 1:31 PM     Patient Phone: [phone]      Task to Jennifer  Upper biopsies are normal--fungal stains are pending  Colon biopsies show lymphoid aggregates but no acute or chronic colitis  Only abnormalities are small bowel  Farooq Lockett - 14 Mar 2016 1:32 PM     TASK REASSIGNED: Previously Assigned To Marcia Guzman - 14 Mar 2016 2:03 PM     TASK REASSIGNED: Previously Assigned To Yolanda Jain  he does not have a f/u  I know he was seen in [facility] but mom said she would come to [place] if need be  You have a 4 pm on Wed in [facility] and a 3 tomorrow here  nothing else this week  can we schedule him out further? This mom may have a lot of questions? Yolanda Jain - 14 Mar 2016 2:48 PM     TASK EDITED  mom aware of bx results and assured her that there is no cancer  scheduled f/u for 3/16 in [place]  Active Problems    1  Abdominal pain, periumbilical (531 10) (G84 19)   2  Abnormal weight loss (783 21) (R63 4)   3  Vitamin D deficiency (268 9) (E55 9)    Current Meds   1  NexIUM 40 MG Oral Capsule Delayed Release (Esomeprazole Magnesium); Therapy: (Recorded:69Ppz2263) to Recorded   2  Singulair 10 MG Oral Tablet (Montelukast Sodium); Therapy: (Recorded:45Wsu2595) to Recorded   3  Vitamin D 2000 UNIT Oral Capsule; take one capsule daily by mouth; Therapy: 44MZC9078 to (Evaluate:11Jun2016); Last Rx:10Lkj1628 Ordered    Allergies    1   Amoxicillin TABS    Signatures   Electronically signed by : Papa Crocker, ; Mar 14 2016  2:48PM EST                       (Author)

## 2018-01-24 ENCOUNTER — TELEPHONE (OUTPATIENT)
Dept: GASTROENTEROLOGY | Facility: CLINIC | Age: 18
End: 2018-01-24

## 2018-01-24 DIAGNOSIS — K50.919 CROHN'S DISEASE WITH COMPLICATION, UNSPECIFIED GASTROINTESTINAL TRACT LOCATION (HCC): Primary | ICD-10-CM

## 2018-01-26 ENCOUNTER — OFFICE VISIT (OUTPATIENT)
Dept: GASTROENTEROLOGY | Facility: CLINIC | Age: 18
End: 2018-01-26
Payer: COMMERCIAL

## 2018-01-26 ENCOUNTER — APPOINTMENT (OUTPATIENT)
Dept: LAB | Facility: HOSPITAL | Age: 18
End: 2018-01-26
Attending: PEDIATRICS
Payer: COMMERCIAL

## 2018-01-26 ENCOUNTER — TRANSCRIBE ORDERS (OUTPATIENT)
Dept: LAB | Facility: HOSPITAL | Age: 18
End: 2018-01-26

## 2018-01-26 VITALS
TEMPERATURE: 97.9 F | HEIGHT: 71 IN | SYSTOLIC BLOOD PRESSURE: 122 MMHG | WEIGHT: 198.13 LBS | BODY MASS INDEX: 27.74 KG/M2 | DIASTOLIC BLOOD PRESSURE: 72 MMHG

## 2018-01-26 DIAGNOSIS — K50.019 CROHN'S DISEASE OF SMALL INTESTINE WITH COMPLICATION (HCC): ICD-10-CM

## 2018-01-26 DIAGNOSIS — K50.919 CROHN'S DISEASE WITH COMPLICATION, UNSPECIFIED GASTROINTESTINAL TRACT LOCATION (HCC): ICD-10-CM

## 2018-01-26 DIAGNOSIS — K50.019 CROHN'S DISEASE OF SMALL INTESTINE WITH COMPLICATION (HCC): Primary | ICD-10-CM

## 2018-01-26 DIAGNOSIS — K50.00 CROHN'S DISEASE OF SMALL INTESTINE WITHOUT COMPLICATION (HCC): Primary | ICD-10-CM

## 2018-01-26 LAB
ALBUMIN SERPL BCP-MCNC: 4.8 G/DL (ref 3.5–5)
ALP SERPL-CCNC: 83 U/L (ref 46–484)
ALT SERPL W P-5'-P-CCNC: 108 U/L (ref 12–78)
ANION GAP SERPL CALCULATED.3IONS-SCNC: 6 MMOL/L (ref 4–13)
AST SERPL W P-5'-P-CCNC: 425 U/L (ref 5–45)
BASOPHILS # BLD AUTO: 0.02 THOUSANDS/ΜL (ref 0–0.1)
BASOPHILS NFR BLD AUTO: 0 % (ref 0–1)
BILIRUB SERPL-MCNC: 0.27 MG/DL (ref 0.2–1)
BUN SERPL-MCNC: 14 MG/DL (ref 5–25)
CALCIUM SERPL-MCNC: 9.2 MG/DL (ref 8.3–10.1)
CHLORIDE SERPL-SCNC: 104 MMOL/L (ref 100–108)
CO2 SERPL-SCNC: 29 MMOL/L (ref 21–32)
CREAT SERPL-MCNC: 1.04 MG/DL (ref 0.6–1.3)
CRP SERPL QL: <3 MG/L
EOSINOPHIL # BLD AUTO: 0.1 THOUSAND/ΜL (ref 0–0.61)
EOSINOPHIL NFR BLD AUTO: 1 % (ref 0–6)
ERYTHROCYTE [DISTWIDTH] IN BLOOD BY AUTOMATED COUNT: 13 % (ref 11.6–15.1)
ERYTHROCYTE [SEDIMENTATION RATE] IN BLOOD: 5 MM/HOUR (ref 0–10)
GFR SERPL CREATININE-BSD FRML MDRD: 104 ML/MIN/1.73SQ M
GLUCOSE SERPL-MCNC: 73 MG/DL (ref 65–140)
HCT VFR BLD AUTO: 43 % (ref 36.5–49.3)
HGB BLD-MCNC: 15.4 G/DL (ref 12–17)
LYMPHOCYTES # BLD AUTO: 2.17 THOUSANDS/ΜL (ref 0.6–4.47)
LYMPHOCYTES NFR BLD AUTO: 26 % (ref 14–44)
MCH RBC QN AUTO: 32.5 PG (ref 26.8–34.3)
MCHC RBC AUTO-ENTMCNC: 35.8 G/DL (ref 31.4–37.4)
MCV RBC AUTO: 91 FL (ref 82–98)
MONOCYTES # BLD AUTO: 0.74 THOUSAND/ΜL (ref 0.17–1.22)
MONOCYTES NFR BLD AUTO: 9 % (ref 4–12)
NEUTROPHILS # BLD AUTO: 5.23 THOUSANDS/ΜL (ref 1.85–7.62)
NEUTS SEG NFR BLD AUTO: 64 % (ref 43–75)
NRBC BLD AUTO-RTO: 0 /100 WBCS
PLATELET # BLD AUTO: 296 THOUSANDS/UL (ref 149–390)
PMV BLD AUTO: 10 FL (ref 8.9–12.7)
POTASSIUM SERPL-SCNC: 4.1 MMOL/L (ref 3.5–5.3)
PROT SERPL-MCNC: 8.2 G/DL (ref 6.4–8.2)
RBC # BLD AUTO: 4.74 MILLION/UL (ref 3.88–5.62)
SODIUM SERPL-SCNC: 139 MMOL/L (ref 136–145)
WBC # BLD AUTO: 8.28 THOUSAND/UL (ref 4.31–10.16)

## 2018-01-26 PROCEDURE — 85652 RBC SED RATE AUTOMATED: CPT

## 2018-01-26 PROCEDURE — 86480 TB TEST CELL IMMUN MEASURE: CPT

## 2018-01-26 PROCEDURE — 85025 COMPLETE CBC W/AUTO DIFF WBC: CPT

## 2018-01-26 PROCEDURE — 80053 COMPREHEN METABOLIC PANEL: CPT

## 2018-01-26 PROCEDURE — 99213 OFFICE O/P EST LOW 20 MIN: CPT | Performed by: PEDIATRICS

## 2018-01-26 PROCEDURE — 36415 COLL VENOUS BLD VENIPUNCTURE: CPT

## 2018-01-26 PROCEDURE — 86140 C-REACTIVE PROTEIN: CPT

## 2018-01-26 RX ORDER — FAMOTIDINE 20 MG/1
TABLET, FILM COATED ORAL EVERY 12 HOURS PRN
COMMUNITY
Start: 2016-08-24

## 2018-01-26 NOTE — PROGRESS NOTES
Assessment/Plan:    No problem-specific Assessment & Plan notes found for this encounter  Diagnoses and all orders for this visit:    Crohn's disease of small intestine without complication ([address] )    Other orders  -     methotrexate 2 5 mg tablet;   -     famotidine (PEPCID) 20 mg tablet; Take by mouth every 12 (twelve) hours as needed      Drexel Frankel is clearly doing very well at this time  We plan to continue 25 mg methotrexate weekly, folic acid daily and famotidine on a p r n  Basis  Follow-up is scheduled for May    Subjective:      Patient ID: Laila Cervantes is a 21 y o  male  HPI  Drexel Frankel was seen today in follow-up in the GI office regarding his Crohn's disease  He is having successful semester at college  He is having no reflux, no pain, diarrhea or bleeding  He has gained approximately 7 kilos since last summer  Laboratory studies last summer were normal he will obtain laboratory studies today  Continues to take folic acid daily, methotrexate weekly and famotidine on a p r n  basis  The following portions of the patient's history were reviewed and updated as appropriate: allergies, current medications, past family history, past medical history, past social history, past surgical history and problem list     Review of Systems   Constitutional: Positive for appetite change  Negative for unexpected weight change  HENT: Negative for congestion, mouth sores and trouble swallowing  Eyes: Negative for photophobia and visual disturbance  Respiratory: Negative for apnea, cough and wheezing  Cardiovascular: Negative for chest pain  Gastrointestinal: Negative for abdominal distention, abdominal pain, anal bleeding, blood in stool, constipation, diarrhea and nausea  Genitourinary: Negative for dysuria  Musculoskeletal: Negative for arthralgias and myalgias  Skin: Negative for color change and rash  Allergic/Immunologic: Negative for environmental allergies and food allergies     Neurological: Negative for headaches  Hematological: Negative for adenopathy  Psychiatric/Behavioral: Negative for behavioral problems and sleep disturbance  Objective:     Physical Exam   Constitutional: He is oriented to person, place, and time  He appears well-developed and well-nourished  HENT:   Head: Normocephalic and atraumatic  Eyes: Conjunctivae and EOM are normal  Pupils are equal, round, and reactive to light  Neck: Normal range of motion  Cardiovascular: Normal rate, regular rhythm and normal heart sounds  No murmur heard  Pulmonary/Chest: Effort normal and breath sounds normal    Abdominal: Soft  Bowel sounds are normal  He exhibits no distension and no mass  There is no tenderness  There is no rebound and no guarding  Musculoskeletal: Normal range of motion  Neurological: He is alert and oriented to person, place, and time  He has normal reflexes  Skin: Skin is warm and dry  Psychiatric: He has a normal mood and affect

## 2018-01-26 NOTE — PATIENT INSTRUCTIONS
I am very pleased with Vin Kruger is progress  We plan to continue methotrexate folic acid and famotidine at their current doses  Laboratory studies will be performed today and every 3 months  Follow-up is scheduled for May at the completion of the current semester

## 2018-01-28 LAB
ANNOTATION COMMENT IMP: NORMAL
GAMMA INTERFERON BACKGROUND BLD IA-ACNC: 0.02 IU/ML
M TB IFN-G BLD-IMP: NEGATIVE
M TB IFN-G CD4+ BCKGRND COR BLD-ACNC: 0 IU/ML
M TB IFN-G CD4+ T-CELLS BLD-ACNC: 0.02 IU/ML
MITOGEN IGNF BLD-ACNC: 5.76 IU/ML
QUANTIFERON-TB GOLD IN TUBE: NORMAL
SERVICE CMNT-IMP: NORMAL

## 2018-01-29 ENCOUNTER — TELEPHONE (OUTPATIENT)
Dept: GASTROENTEROLOGY | Facility: CLINIC | Age: 18
End: 2018-01-29

## 2018-01-29 DIAGNOSIS — K50.919 CROHN'S DISEASE WITH COMPLICATION, UNSPECIFIED GASTROINTESTINAL TRACT LOCATION (HCC): Primary | ICD-10-CM

## 2018-01-29 NOTE — TELEPHONE ENCOUNTER
Mom aware of liver enzyme results and need to stop mtx for now and continue folic acid and get repeat labs in one week  Spoke to mom regarding if side effects not from mtx could be from alcohol, acetaminophen or hepatitis  Answered mom's questions and instructed her that we will talk after the results next week and if we need to stop mtx will talk about other meds

## 2018-02-14 ENCOUNTER — TELEPHONE (OUTPATIENT)
Dept: GASTROENTEROLOGY | Facility: CLINIC | Age: 18
End: 2018-02-14

## 2018-02-14 DIAGNOSIS — K50.90 CROHN'S DISEASE WITHOUT COMPLICATION, UNSPECIFIED GASTROINTESTINAL TRACT LOCATION (HCC): Primary | ICD-10-CM

## 2018-02-14 DIAGNOSIS — K50.919 CROHN'S DISEASE WITH COMPLICATION, UNSPECIFIED GASTROINTESTINAL TRACT LOCATION (HCC): Primary | ICD-10-CM

## 2018-02-14 RX ORDER — FOLIC ACID 1 MG/1
2 TABLET ORAL DAILY
Qty: 60 TABLET | Refills: 3 | Status: SHIPPED | OUTPATIENT
Start: 2018-02-14

## 2018-02-14 NOTE — PROGRESS NOTES
Liver enzymes normal per Dr Nida Barrientos resume mtx and increase folic acid to 2 mg and repeat cmp in 2 weeks

## 2018-02-14 NOTE — TELEPHONE ENCOUNTER
MOM HAD BEEN GETTING FOLIC ACID OTC AND SHE ASKED IF YOU COULD PLEASE SEND IN A SCRIPT TO PHARMACY  SHE WILL  AND TAKE TO HIM THIS WEEKEND   WE ARE INCREASING TO 2 MG

## 2018-02-14 NOTE — TELEPHONE ENCOUNTER
Mom aware liver enzymes normal   Can resume mtx and increase folic acid to 2 mg  And repeat cmp in 2 weeks  Faxed script to health center at [facility]  [phone]

## 2018-03-09 ENCOUNTER — TELEPHONE (OUTPATIENT)
Dept: GASTROENTEROLOGY | Facility: CLINIC | Age: 18
End: 2018-03-09

## 2018-03-12 ENCOUNTER — TELEPHONE (OUTPATIENT)
Dept: GASTROENTEROLOGY | Facility: CLINIC | Age: 18
End: 2018-03-12

## 2018-03-12 DIAGNOSIS — R74.8 ELEVATED LIVER ENZYMES: ICD-10-CM

## 2018-03-12 DIAGNOSIS — K50.919 CROHN'S DISEASE WITH COMPLICATION, UNSPECIFIED GASTROINTESTINAL TRACT LOCATION (HCC): Primary | ICD-10-CM

## 2018-04-06 DIAGNOSIS — K50.80 CROHN'S DISEASE OF BOTH SMALL AND LARGE INTESTINE WITHOUT COMPLICATION (HCC): Primary | ICD-10-CM

## 2018-04-25 ENCOUNTER — TELEPHONE (OUTPATIENT)
Dept: GASTROENTEROLOGY | Facility: CLINIC | Age: 18
End: 2018-04-25

## 2018-04-25 NOTE — TELEPHONE ENCOUNTER
Spoke with mother she was told that I wasn't sure if Dr Inge Madden had read the results yet  I will call tomorrow with results or notes from the provider

## 2018-04-26 ENCOUNTER — TELEPHONE (OUTPATIENT)
Dept: GASTROENTEROLOGY | Facility: CLINIC | Age: 18
End: 2018-04-26

## 2018-04-26 NOTE — TELEPHONE ENCOUNTER
Left message for mother to call back to let us know which lab they used for last CMP because we haven't received any recent labs last results we got were from February

## 2018-05-01 ENCOUNTER — DOCUMENTATION (OUTPATIENT)
Dept: GASTROENTEROLOGY | Facility: CLINIC | Age: 18
End: 2018-05-01

## 2018-05-31 ENCOUNTER — OFFICE VISIT (OUTPATIENT)
Dept: GASTROENTEROLOGY | Facility: CLINIC | Age: 18
End: 2018-05-31
Payer: COMMERCIAL

## 2018-05-31 VITALS
WEIGHT: 185.41 LBS | RESPIRATION RATE: 16 BRPM | HEART RATE: 84 BPM | SYSTOLIC BLOOD PRESSURE: 112 MMHG | HEIGHT: 71 IN | BODY MASS INDEX: 25.96 KG/M2 | TEMPERATURE: 98.6 F | DIASTOLIC BLOOD PRESSURE: 68 MMHG

## 2018-05-31 DIAGNOSIS — K21.00 GASTROESOPHAGEAL REFLUX DISEASE WITH ESOPHAGITIS: ICD-10-CM

## 2018-05-31 DIAGNOSIS — K50.00 CROHN'S DISEASE OF SMALL INTESTINE WITHOUT COMPLICATION (HCC): Primary | ICD-10-CM

## 2018-05-31 PROCEDURE — 99213 OFFICE O/P EST LOW 20 MIN: CPT | Performed by: PEDIATRICS

## 2018-05-31 NOTE — PATIENT INSTRUCTIONS
I am very pleased with her progress  Please have a wonderful trip to [facility] the summer  When you get back, please have laboratory studies obtained before you return to school  We would like to see you back in the office at winter break  Please continue methotrexate weekly, folic acid daily and famotidine on an as needed basis

## 2018-05-31 NOTE — PROGRESS NOTES
Assessment/Plan:    No problem-specific Assessment & Plan notes found for this encounter  Diagnoses and all orders for this visit:    Crohn's disease of small intestine without complication ([facility] [address] )  -     CBC and differential; Future  -     Comprehensive metabolic panel; Future  -     C-reactive protein; Future  -     Tissue transglutaminase, IgA; Future  -     Sedimentation rate, automated; Future  -     Quantiferon TB Gold; Future    Gastroesophageal reflux disease with esophagitis        Carine Plunkett is doing very well  We plan to obtain follow-up laboratory studies before the end of the summer  We plan to see him in follow-up at winter break  We will continue the same medications unless there are changes in his status  Subjective:      Patient ID: Chilango Thomas is a 19 y o  male  HPI  Carine Plunkett was seen today in follow-up in the GI office regarding Crohn's disease and reflux esophagitis  Since his last visit, he has continued to do beautifully  He has not had any significant symptoms during this past semester  His aminotransferase is that were elevated last fall have normalized  He continues to receive folic acid daily methotrexate weekly and famotidine on a p r n  basis  He has not had any side effects from medications  He is planning to spent in the summer in [facility] and then to resume his studies in the fall  The following portions of the patient's history were reviewed and updated as appropriate: allergies, current medications, past family history, past medical history, past social history, past surgical history and problem list     Review of Systems   Constitutional: Negative for activity change, appetite change and unexpected weight change  HENT: Negative for congestion, mouth sores and trouble swallowing  Eyes: Negative for photophobia and visual disturbance  Respiratory: Negative for apnea, cough and wheezing  Cardiovascular: Negative for chest pain     Gastrointestinal: Negative for abdominal distention, abdominal pain, anal bleeding, blood in stool, constipation, diarrhea and nausea  Genitourinary: Negative for dysuria  Musculoskeletal: Negative for arthralgias and myalgias  Skin: Negative for color change and rash  Allergic/Immunologic: Negative for environmental allergies and food allergies  Neurological: Negative for headaches  Hematological: Negative for adenopathy  Psychiatric/Behavioral: Negative for behavioral problems and sleep disturbance  Objective:      /68 (BP Location: Left arm, Patient Position: Sitting, Cuff Size: Standard)   Pulse 84   Temp 98 6 °F (37 °C) (Temporal)   Resp 16   Ht 5' 11 42" (1 814 m)   Wt 84 1 kg (185 lb 6 5 oz)   BMI 25 56 kg/m²          Physical Exam   Constitutional: He is oriented to person, place, and time  He appears well-developed and well-nourished  HENT:   Head: Normocephalic and atraumatic  Mouth/Throat: No oropharyngeal exudate  Eyes: Conjunctivae and EOM are normal  Pupils are equal, round, and reactive to light  Neck: Normal range of motion  No thyromegaly present  Cardiovascular: Normal rate, regular rhythm and normal heart sounds  No murmur heard  Pulmonary/Chest: Effort normal and breath sounds normal    Abdominal: Soft  Bowel sounds are normal  He exhibits no distension and no mass  There is no tenderness  There is no rebound and no guarding  Musculoskeletal: Normal range of motion  He exhibits no edema or tenderness  Lymphadenopathy:     He has no cervical adenopathy  Neurological: He is alert and oriented to person, place, and time  He has normal reflexes  Skin: Skin is warm and dry  No rash noted  Psychiatric: He has a normal mood and affect

## 2018-06-05 DIAGNOSIS — K50.80 CROHN'S DISEASE OF BOTH SMALL AND LARGE INTESTINE WITHOUT COMPLICATION (HCC): ICD-10-CM

## 2018-06-13 ENCOUNTER — TELEPHONE (OUTPATIENT)
Dept: GASTROENTEROLOGY | Facility: CLINIC | Age: 18
End: 2018-06-13

## 2018-06-13 NOTE — TELEPHONE ENCOUNTER
Not back in labs or media--I assume he did them elsewhere and they have not yet gotten through the fax cue

## 2018-08-14 ENCOUNTER — LAB (OUTPATIENT)
Dept: LAB | Facility: MEDICAL CENTER | Age: 18
End: 2018-08-14
Payer: COMMERCIAL

## 2018-08-14 ENCOUNTER — TRANSCRIBE ORDERS (OUTPATIENT)
Dept: ADMINISTRATIVE | Facility: HOSPITAL | Age: 18
End: 2018-08-14

## 2018-08-14 DIAGNOSIS — K50.00 CROHN'S DISEASE OF SMALL INTESTINE WITHOUT COMPLICATION (HCC): ICD-10-CM

## 2018-08-14 DIAGNOSIS — K50.919 CROHN'S DISEASE WITH COMPLICATION, UNSPECIFIED GASTROINTESTINAL TRACT LOCATION (HCC): ICD-10-CM

## 2018-08-14 LAB
ALBUMIN SERPL BCP-MCNC: 4.1 G/DL (ref 3.5–5)
ALP SERPL-CCNC: 99 U/L (ref 46–116)
ALT SERPL W P-5'-P-CCNC: 20 U/L (ref 12–78)
ANION GAP SERPL CALCULATED.3IONS-SCNC: 3 MMOL/L (ref 4–13)
AST SERPL W P-5'-P-CCNC: 15 U/L (ref 5–45)
BASOPHILS # BLD AUTO: 0.05 THOUSANDS/ΜL (ref 0–0.1)
BASOPHILS NFR BLD AUTO: 1 % (ref 0–1)
BILIRUB SERPL-MCNC: 0.34 MG/DL (ref 0.2–1)
BUN SERPL-MCNC: 12 MG/DL (ref 5–25)
CALCIUM SERPL-MCNC: 8.9 MG/DL (ref 8.3–10.1)
CHLORIDE SERPL-SCNC: 105 MMOL/L (ref 100–108)
CO2 SERPL-SCNC: 30 MMOL/L (ref 21–32)
CREAT SERPL-MCNC: 1.02 MG/DL (ref 0.6–1.3)
CRP SERPL QL: <3 MG/L
EOSINOPHIL # BLD AUTO: 0.14 THOUSAND/ΜL (ref 0–0.61)
EOSINOPHIL NFR BLD AUTO: 3 % (ref 0–6)
ERYTHROCYTE [DISTWIDTH] IN BLOOD BY AUTOMATED COUNT: 12.6 % (ref 11.6–15.1)
ERYTHROCYTE [SEDIMENTATION RATE] IN BLOOD: 2 MM/HOUR (ref 0–10)
GFR SERPL CREATININE-BSD FRML MDRD: 105 ML/MIN/1.73SQ M
GLUCOSE SERPL-MCNC: 74 MG/DL (ref 65–140)
HCT VFR BLD AUTO: 43 % (ref 36.5–49.3)
HGB BLD-MCNC: 14.7 G/DL (ref 12–17)
IMM GRANULOCYTES # BLD AUTO: 0 THOUSAND/UL (ref 0–0.2)
IMM GRANULOCYTES NFR BLD AUTO: 0 % (ref 0–2)
LYMPHOCYTES # BLD AUTO: 1.85 THOUSANDS/ΜL (ref 0.6–4.47)
LYMPHOCYTES NFR BLD AUTO: 39 % (ref 14–44)
MCH RBC QN AUTO: 31.7 PG (ref 26.8–34.3)
MCHC RBC AUTO-ENTMCNC: 34.2 G/DL (ref 31.4–37.4)
MCV RBC AUTO: 93 FL (ref 82–98)
MONOCYTES # BLD AUTO: 0.42 THOUSAND/ΜL (ref 0.17–1.22)
MONOCYTES NFR BLD AUTO: 9 % (ref 4–12)
NEUTROPHILS # BLD AUTO: 2.33 THOUSANDS/ΜL (ref 1.85–7.62)
NEUTS SEG NFR BLD AUTO: 48 % (ref 43–75)
NRBC BLD AUTO-RTO: 0 /100 WBCS
PLATELET # BLD AUTO: 155 THOUSANDS/UL (ref 149–390)
PMV BLD AUTO: 10.7 FL (ref 8.9–12.7)
POTASSIUM SERPL-SCNC: 4.3 MMOL/L (ref 3.5–5.3)
PROT SERPL-MCNC: 7.4 G/DL (ref 6.4–8.2)
RBC # BLD AUTO: 4.64 MILLION/UL (ref 3.88–5.62)
SODIUM SERPL-SCNC: 138 MMOL/L (ref 136–145)
WBC # BLD AUTO: 4.79 THOUSAND/UL (ref 4.31–10.16)

## 2018-08-14 PROCEDURE — 80053 COMPREHEN METABOLIC PANEL: CPT

## 2018-08-14 PROCEDURE — 86480 TB TEST CELL IMMUN MEASURE: CPT

## 2018-08-14 PROCEDURE — 36415 COLL VENOUS BLD VENIPUNCTURE: CPT

## 2018-08-14 PROCEDURE — 83516 IMMUNOASSAY NONANTIBODY: CPT

## 2018-08-14 PROCEDURE — 85652 RBC SED RATE AUTOMATED: CPT

## 2018-08-14 PROCEDURE — 86140 C-REACTIVE PROTEIN: CPT

## 2018-08-14 PROCEDURE — 85025 COMPLETE CBC W/AUTO DIFF WBC: CPT

## 2018-08-15 LAB — TTG IGA SER-ACNC: <2 U/ML (ref 0–3)

## 2018-08-17 LAB
ANNOTATION COMMENT IMP: NORMAL
GAMMA INTERFERON BACKGROUND BLD IA-ACNC: 0.07 IU/ML
M TB IFN-G BLD-IMP: NEGATIVE
M TB IFN-G CD4+ BCKGRND COR BLD-ACNC: <0.01 IU/ML
M TB IFN-G CD4+ T-CELLS BLD-ACNC: 0.05 IU/ML
MITOGEN IGNF BLD-ACNC: >10 IU/ML
QUANTIFERON-TB GOLD IN TUBE: NORMAL
SERVICE CMNT-IMP: NORMAL

## 2019-01-09 ENCOUNTER — OFFICE VISIT (OUTPATIENT)
Dept: GASTROENTEROLOGY | Facility: CLINIC | Age: 19
End: 2019-01-09
Payer: COMMERCIAL

## 2019-01-09 VITALS
WEIGHT: 183.2 LBS | BODY MASS INDEX: 25.65 KG/M2 | HEART RATE: 90 BPM | DIASTOLIC BLOOD PRESSURE: 70 MMHG | RESPIRATION RATE: 12 BRPM | HEIGHT: 71 IN | SYSTOLIC BLOOD PRESSURE: 120 MMHG | TEMPERATURE: 98.4 F

## 2019-01-09 DIAGNOSIS — K21.00 GASTROESOPHAGEAL REFLUX DISEASE WITH ESOPHAGITIS: ICD-10-CM

## 2019-01-09 DIAGNOSIS — K50.00 CROHN'S DISEASE OF SMALL INTESTINE WITHOUT COMPLICATION (HCC): Primary | ICD-10-CM

## 2019-01-09 PROCEDURE — 99213 OFFICE O/P EST LOW 20 MIN: CPT | Performed by: PEDIATRICS

## 2019-01-09 NOTE — PATIENT INSTRUCTIONS
As we discussed today, it is important that you check with your insurance documentation to determine which gastroenterologists are participating in your plan  If the gastroenterologists in [facility] gastroenterology who have office hours at the [facility] are participating, I would feel very comfortable transitioning her care to them  You should make an appointment to be seen at the end of the next semester  I have supplied you with laboratory slips for blood work that should be performed prior to your next visit  Will be glad to continue to provide refills for medications until you transition to the adult gastroenterologist   Please do not hesitate to give us a call if you have issues

## 2019-01-09 NOTE — PROGRESS NOTES
Assessment/Plan:    No problem-specific Assessment & Plan notes found for this encounter  Diagnoses and all orders for this visit:    Crohn's disease of small intestine without complication ([address] )  -     CBC and differential; Future  -     Comprehensive metabolic panel; Future  -     C-reactive protein; Future  -     Sedimentation rate, automated; Future  -     Vitamin D 25 hydroxy; Future  -     Lipid panel; Future    Gastroesophageal reflux disease with esophagitis        At this time, I see no reason to make changes to medications since they are controlling disease well  I have asked that he review which providers are in network for his insurance  I have recommended the gastroenterologists in the [address] [place] [address] group who have office hours at the [facility] that would be much closer for his family  He should make an appointment with 1 of those physicians for next may after his semester is complete  We will obtain monitoring laboratory studies between now and the next visit, and have recommended that he continue the same medications  Subjective:      Patient ID: Zahida Santiago is a 19 y o  male  Brad Basilio was seen today in follow-up in the GI office regarding Crohn's disease and gastroesophageal reflux  He is attending college on [place] and has been doing very well  He reports that he has occasional heartburn, generally but he is under stress  If he takes famotidine at that time he has relief  He has been using methotrexate weekly and folic acid daily for inflammatory bowel disease and has had no side effects from the medications and no weight loss, diarrhea, vomiting, bleeding or other symptoms to suggest active disease  He did have some laboratory studies performed that were negative including QuantiFERON gold  Today we discussed transition to adult gastroenterologists that we will likely perform at the end of the next semester          The following portions of the patient's history were reviewed and updated as appropriate: allergies, current medications, past family history, past medical history, past social history, past surgical history and problem list     Review of Systems   Constitutional: Negative for activity change, appetite change and unexpected weight change  HENT: Negative for congestion, mouth sores and trouble swallowing  Eyes: Negative for photophobia and visual disturbance  Respiratory: Negative for apnea, cough and wheezing  Cardiovascular: Negative for chest pain  Gastrointestinal: Positive for abdominal pain  Negative for abdominal distention, anal bleeding, blood in stool, constipation, diarrhea and nausea  Rare heartburn, response to famotidine   Genitourinary: Negative for dysuria  Musculoskeletal: Negative for arthralgias and myalgias  Skin: Negative for color change and rash  Allergic/Immunologic: Negative for environmental allergies and food allergies  Neurological: Negative for headaches  Hematological: Negative for adenopathy  Psychiatric/Behavioral: Negative for behavioral problems and sleep disturbance  Objective:      /70 (BP Location: Left arm, Patient Position: Sitting, Cuff Size: Adult)   Pulse 90   Temp 98 4 °F (36 9 °C) (Temporal)   Resp 12   Ht 5' 11 26" (1 81 m)   Wt 83 1 kg (183 lb 3 2 oz)   BMI 25 37 kg/m²          Physical Exam   Constitutional: He appears well-developed and well-nourished  Cardiovascular: Normal rate, regular rhythm and normal heart sounds  No murmur heard  Pulmonary/Chest: Effort normal and breath sounds normal    Abdominal: Soft  Bowel sounds are normal  There is no hepatosplenomegaly

## 2019-01-10 ENCOUNTER — LAB (OUTPATIENT)
Dept: LAB | Facility: MEDICAL CENTER | Age: 19
End: 2019-01-10
Payer: COMMERCIAL

## 2019-01-10 DIAGNOSIS — K50.00 CROHN'S DISEASE OF SMALL INTESTINE WITHOUT COMPLICATION (HCC): ICD-10-CM

## 2019-01-10 LAB
25(OH)D3 SERPL-MCNC: 29 NG/ML (ref 30–100)
ALBUMIN SERPL BCP-MCNC: 4.1 G/DL (ref 3.5–5)
ALP SERPL-CCNC: 83 U/L (ref 46–116)
ALT SERPL W P-5'-P-CCNC: 44 U/L (ref 12–78)
ANION GAP SERPL CALCULATED.3IONS-SCNC: 6 MMOL/L (ref 4–13)
AST SERPL W P-5'-P-CCNC: 35 U/L (ref 5–45)
BASOPHILS # BLD AUTO: 0.03 THOUSANDS/ΜL (ref 0–0.1)
BASOPHILS NFR BLD AUTO: 1 % (ref 0–1)
BILIRUB SERPL-MCNC: 0.29 MG/DL (ref 0.2–1)
BUN SERPL-MCNC: 13 MG/DL (ref 5–25)
CALCIUM SERPL-MCNC: 8.9 MG/DL (ref 8.3–10.1)
CHLORIDE SERPL-SCNC: 105 MMOL/L (ref 100–108)
CHOLEST SERPL-MCNC: 123 MG/DL (ref 50–200)
CO2 SERPL-SCNC: 29 MMOL/L (ref 21–32)
CREAT SERPL-MCNC: 1.13 MG/DL (ref 0.6–1.3)
CRP SERPL QL: <3 MG/L
EOSINOPHIL # BLD AUTO: 0.13 THOUSAND/ΜL (ref 0–0.61)
EOSINOPHIL NFR BLD AUTO: 3 % (ref 0–6)
ERYTHROCYTE [DISTWIDTH] IN BLOOD BY AUTOMATED COUNT: 13.1 % (ref 11.6–15.1)
ERYTHROCYTE [SEDIMENTATION RATE] IN BLOOD: 2 MM/HOUR (ref 0–10)
GFR SERPL CREATININE-BSD FRML MDRD: 93 ML/MIN/1.73SQ M
GLUCOSE P FAST SERPL-MCNC: 80 MG/DL (ref 65–99)
HCT VFR BLD AUTO: 45.1 % (ref 36.5–49.3)
HDLC SERPL-MCNC: 32 MG/DL (ref 40–60)
HGB BLD-MCNC: 15 G/DL (ref 12–17)
IMM GRANULOCYTES # BLD AUTO: 0 THOUSAND/UL (ref 0–0.2)
IMM GRANULOCYTES NFR BLD AUTO: 0 % (ref 0–2)
LDLC SERPL CALC-MCNC: 79 MG/DL (ref 0–100)
LYMPHOCYTES # BLD AUTO: 2.1 THOUSANDS/ΜL (ref 0.6–4.47)
LYMPHOCYTES NFR BLD AUTO: 42 % (ref 14–44)
MCH RBC QN AUTO: 31.3 PG (ref 26.8–34.3)
MCHC RBC AUTO-ENTMCNC: 33.3 G/DL (ref 31.4–37.4)
MCV RBC AUTO: 94 FL (ref 82–98)
MONOCYTES # BLD AUTO: 0.47 THOUSAND/ΜL (ref 0.17–1.22)
MONOCYTES NFR BLD AUTO: 9 % (ref 4–12)
NEUTROPHILS # BLD AUTO: 2.32 THOUSANDS/ΜL (ref 1.85–7.62)
NEUTS SEG NFR BLD AUTO: 45 % (ref 43–75)
NONHDLC SERPL-MCNC: 91 MG/DL
NRBC BLD AUTO-RTO: 0 /100 WBCS
PLATELET # BLD AUTO: 167 THOUSANDS/UL (ref 149–390)
PMV BLD AUTO: 10.7 FL (ref 8.9–12.7)
POTASSIUM SERPL-SCNC: 4.5 MMOL/L (ref 3.5–5.3)
PROT SERPL-MCNC: 7.4 G/DL (ref 6.4–8.2)
RBC # BLD AUTO: 4.8 MILLION/UL (ref 3.88–5.62)
SODIUM SERPL-SCNC: 140 MMOL/L (ref 136–145)
TRIGL SERPL-MCNC: 61 MG/DL
WBC # BLD AUTO: 5.05 THOUSAND/UL (ref 4.31–10.16)

## 2019-01-10 PROCEDURE — 82306 VITAMIN D 25 HYDROXY: CPT

## 2019-01-10 PROCEDURE — 85652 RBC SED RATE AUTOMATED: CPT

## 2019-01-10 PROCEDURE — 36415 COLL VENOUS BLD VENIPUNCTURE: CPT

## 2019-01-10 PROCEDURE — 85025 COMPLETE CBC W/AUTO DIFF WBC: CPT

## 2019-01-10 PROCEDURE — 80053 COMPREHEN METABOLIC PANEL: CPT

## 2019-01-10 PROCEDURE — 86140 C-REACTIVE PROTEIN: CPT

## 2019-01-10 PROCEDURE — 80061 LIPID PANEL: CPT

## 2020-11-06 ENCOUNTER — NURSE TRIAGE (OUTPATIENT)
Dept: OTHER | Facility: OTHER | Age: 20
End: 2020-11-06

## 2020-11-06 DIAGNOSIS — Z20.828 SARS-ASSOCIATED CORONAVIRUS EXPOSURE: Primary | ICD-10-CM

## 2020-11-06 DIAGNOSIS — Z20.828 SARS-ASSOCIATED CORONAVIRUS EXPOSURE: ICD-10-CM

## 2020-11-06 PROCEDURE — U0003 INFECTIOUS AGENT DETECTION BY NUCLEIC ACID (DNA OR RNA); SEVERE ACUTE RESPIRATORY SYNDROME CORONAVIRUS 2 (SARS-COV-2) (CORONAVIRUS DISEASE [COVID-19]), AMPLIFIED PROBE TECHNIQUE, MAKING USE OF HIGH THROUGHPUT TECHNOLOGIES AS DESCRIBED BY CMS-2020-01-R: HCPCS | Performed by: FAMILY MEDICINE

## 2020-11-08 ENCOUNTER — TELEPHONE (OUTPATIENT)
Dept: OTHER | Facility: OTHER | Age: 20
End: 2020-11-08

## 2020-11-08 LAB — SARS-COV-2 RNA SPEC QL NAA+PROBE: NOT DETECTED

## 2020-11-09 ENCOUNTER — TELEPHONE (OUTPATIENT)
Dept: OTHER | Facility: OTHER | Age: 20
End: 2020-11-09

## 2020-11-11 ENCOUNTER — TELEPHONE (OUTPATIENT)
Dept: OTHER | Facility: OTHER | Age: 20
End: 2020-11-11

## 2020-11-13 ENCOUNTER — NURSE TRIAGE (OUTPATIENT)
Dept: OTHER | Facility: OTHER | Age: 20
End: 2020-11-13

## 2024-01-01 ENCOUNTER — NON-APPOINTMENT (OUTPATIENT)
Age: 24
End: 2024-01-01

## 2024-03-07 ENCOUNTER — NON-APPOINTMENT (OUTPATIENT)
Age: 24
End: 2024-03-07

## 2024-03-11 ENCOUNTER — APPOINTMENT (OUTPATIENT)
Dept: GASTROENTEROLOGY | Facility: CLINIC | Age: 24
End: 2024-03-11
Payer: COMMERCIAL

## 2024-03-11 VITALS
OXYGEN SATURATION: 98 % | SYSTOLIC BLOOD PRESSURE: 116 MMHG | DIASTOLIC BLOOD PRESSURE: 76 MMHG | WEIGHT: 226 LBS | HEIGHT: 72 IN | TEMPERATURE: 98 F | RESPIRATION RATE: 12 BRPM | HEART RATE: 81 BPM | BODY MASS INDEX: 30.61 KG/M2

## 2024-03-11 DIAGNOSIS — J45.20 MILD INTERMITTENT ASTHMA, UNCOMPLICATED: ICD-10-CM

## 2024-03-11 DIAGNOSIS — Z78.9 OTHER SPECIFIED HEALTH STATUS: ICD-10-CM

## 2024-03-11 DIAGNOSIS — K50.80 CROHN'S DISEASE OF BOTH SMALL AND LARGE INTESTINE W/OUT COMPLICATIONS: ICD-10-CM

## 2024-03-11 PROBLEM — Z00.00 ENCOUNTER FOR PREVENTIVE HEALTH EXAMINATION: Status: ACTIVE | Noted: 2024-03-11

## 2024-03-11 PROCEDURE — 99204 OFFICE O/P NEW MOD 45 MIN: CPT | Mod: 25

## 2024-03-11 PROCEDURE — 36415 COLL VENOUS BLD VENIPUNCTURE: CPT

## 2024-03-11 RX ORDER — USTEKINUMAB 90 MG/ML
90 INJECTION, SOLUTION SUBCUTANEOUS
Refills: 0 | Status: ACTIVE | COMMUNITY

## 2024-03-11 NOTE — HISTORY OF PRESENT ILLNESS
[FreeTextEntry1] : He is a 23-year-old asymptomatic male with a history of Crohn's disease since age 15 which has been in remission since he has been on Stelara for the last 2 years.  He relocated from [place] to [place] due to his job and i sin need of a new physician. He is feeling well and has no complaints. His last colonoscopy was last year which was normal.  He has Crohn's involving both his small intestine and colon.  He is presently on Stelara every 8 weeks.  He is not sure of the dose but it is probably 90 mg.  He has a family history of Crohn's disease specifically his younger brother and his aunt..

## 2024-03-12 LAB
ALBUMIN SERPL ELPH-MCNC: 4.8 G/DL
ALP BLD-CCNC: 90 U/L
ALT SERPL-CCNC: 26 U/L
ANION GAP SERPL CALC-SCNC: 15 MMOL/L
AST SERPL-CCNC: 23 U/L
BASOPHILS # BLD AUTO: 0.05 K/UL
BASOPHILS NFR BLD AUTO: 0.7 %
BILIRUB SERPL-MCNC: 0.5 MG/DL
BUN SERPL-MCNC: 15 MG/DL
CALCIUM SERPL-MCNC: 9.7 MG/DL
CEA SERPL-MCNC: 2.6 NG/ML
CHLORIDE SERPL-SCNC: 100 MMOL/L
CO2 SERPL-SCNC: 25 MMOL/L
CREAT SERPL-MCNC: 1.08 MG/DL
CRP SERPL-MCNC: <3 MG/L
EGFR: 98 ML/MIN/1.73M2
EOSINOPHIL # BLD AUTO: 0.33 K/UL
EOSINOPHIL NFR BLD AUTO: 4.9 %
ERYTHROCYTE [SEDIMENTATION RATE] IN BLOOD BY WESTERGREN METHOD: 5 MM/HR
FERRITIN SERPL-MCNC: 96 NG/ML
GLUCOSE SERPL-MCNC: 98 MG/DL
HCT VFR BLD CALC: 44.6 %
HGB BLD-MCNC: 15.7 G/DL
IMM GRANULOCYTES NFR BLD AUTO: 0.3 %
IRON SATN MFR SERPL: 42 %
IRON SERPL-MCNC: 134 UG/DL
LYMPHOCYTES # BLD AUTO: 2.7 K/UL
LYMPHOCYTES NFR BLD AUTO: 40.2 %
MAN DIFF?: NORMAL
MCHC RBC-ENTMCNC: 30.9 PG
MCHC RBC-ENTMCNC: 35.2 GM/DL
MCV RBC AUTO: 87.8 FL
MONOCYTES # BLD AUTO: 0.41 K/UL
MONOCYTES NFR BLD AUTO: 6.1 %
NEUTROPHILS # BLD AUTO: 3.2 K/UL
NEUTROPHILS NFR BLD AUTO: 47.8 %
PLATELET # BLD AUTO: 274 K/UL
POTASSIUM SERPL-SCNC: 4.2 MMOL/L
PROT SERPL-MCNC: 7.9 G/DL
RBC # BLD: 5.08 M/UL
RBC # FLD: 11.6 %
SODIUM SERPL-SCNC: 140 MMOL/L
TIBC SERPL-MCNC: 319 UG/DL
UIBC SERPL-MCNC: 185 UG/DL
WBC # FLD AUTO: 6.71 K/UL

## 2024-03-19 RX ORDER — USTEKINUMAB 90 MG/ML
90 INJECTION, SOLUTION SUBCUTANEOUS
Qty: 3 | Refills: 1 | Status: ACTIVE | COMMUNITY
Start: 2024-03-14 | End: 1900-01-01

## 2024-05-22 ENCOUNTER — OUTPATIENT (OUTPATIENT)
Dept: OUTPATIENT SERVICES | Facility: HOSPITAL | Age: 24
LOS: 1 days | End: 2024-05-22

## 2024-05-22 ENCOUNTER — APPOINTMENT (OUTPATIENT)
Dept: INTERNAL MEDICINE | Facility: CLINIC | Age: 24
End: 2024-05-22

## 2024-07-16 ENCOUNTER — APPOINTMENT (OUTPATIENT)
Dept: GASTROENTEROLOGY | Facility: CLINIC | Age: 24
End: 2024-07-16
Payer: COMMERCIAL

## 2024-07-16 VITALS
OXYGEN SATURATION: 99 % | BODY MASS INDEX: 31.15 KG/M2 | HEART RATE: 80 BPM | HEIGHT: 72 IN | WEIGHT: 230 LBS | TEMPERATURE: 97 F | SYSTOLIC BLOOD PRESSURE: 120 MMHG | DIASTOLIC BLOOD PRESSURE: 78 MMHG | RESPIRATION RATE: 14 BRPM

## 2024-07-16 DIAGNOSIS — K50.80 CROHN'S DISEASE OF BOTH SMALL AND LARGE INTESTINE W/OUT COMPLICATIONS: ICD-10-CM

## 2024-07-16 PROCEDURE — 99213 OFFICE O/P EST LOW 20 MIN: CPT

## 2025-01-21 ENCOUNTER — APPOINTMENT (OUTPATIENT)
Dept: GASTROENTEROLOGY | Facility: CLINIC | Age: 25
End: 2025-01-21
Payer: COMMERCIAL

## 2025-01-21 VITALS
BODY MASS INDEX: 32.78 KG/M2 | HEIGHT: 72 IN | WEIGHT: 242 LBS | SYSTOLIC BLOOD PRESSURE: 130 MMHG | OXYGEN SATURATION: 99 % | TEMPERATURE: 98.2 F | RESPIRATION RATE: 14 BRPM | DIASTOLIC BLOOD PRESSURE: 84 MMHG | HEART RATE: 85 BPM

## 2025-01-21 DIAGNOSIS — K50.80 CROHN'S DISEASE OF BOTH SMALL AND LARGE INTESTINE W/OUT COMPLICATIONS: ICD-10-CM

## 2025-01-21 PROCEDURE — 99213 OFFICE O/P EST LOW 20 MIN: CPT

## 2025-01-21 PROCEDURE — 36415 COLL VENOUS BLD VENIPUNCTURE: CPT

## 2025-01-21 PROCEDURE — G2211 COMPLEX E/M VISIT ADD ON: CPT

## 2025-01-22 LAB
ALBUMIN SERPL ELPH-MCNC: 4.8 G/DL
ALP BLD-CCNC: 95 U/L
ALT SERPL-CCNC: 28 U/L
ANION GAP SERPL CALC-SCNC: 12 MMOL/L
AST SERPL-CCNC: 27 U/L
BASOPHILS # BLD AUTO: 0.05 K/UL
BASOPHILS NFR BLD AUTO: 0.7 %
BILIRUB SERPL-MCNC: 0.3 MG/DL
BUN SERPL-MCNC: 24 MG/DL
CALCIUM SERPL-MCNC: 9.9 MG/DL
CEA SERPL-MCNC: 2.6 NG/ML
CHLORIDE SERPL-SCNC: 101 MMOL/L
CO2 SERPL-SCNC: 25 MMOL/L
CREAT SERPL-MCNC: 1.11 MG/DL
CRP SERPL-MCNC: <3 MG/L
EGFR: 94 ML/MIN/1.73M2
EOSINOPHIL # BLD AUTO: 0.36 K/UL
EOSINOPHIL NFR BLD AUTO: 4.9 %
ERYTHROCYTE [SEDIMENTATION RATE] IN BLOOD BY WESTERGREN METHOD: 11 MM/HR
GLUCOSE SERPL-MCNC: 103 MG/DL
HCT VFR BLD CALC: 44.7 %
HGB BLD-MCNC: 15.5 G/DL
IMM GRANULOCYTES NFR BLD AUTO: 1 %
LYMPHOCYTES # BLD AUTO: 2.95 K/UL
LYMPHOCYTES NFR BLD AUTO: 40.4 %
MAN DIFF?: NORMAL
MCHC RBC-ENTMCNC: 30.6 PG
MCHC RBC-ENTMCNC: 34.7 G/DL
MCV RBC AUTO: 88.2 FL
MONOCYTES # BLD AUTO: 0.49 K/UL
MONOCYTES NFR BLD AUTO: 6.7 %
NEUTROPHILS # BLD AUTO: 3.39 K/UL
NEUTROPHILS NFR BLD AUTO: 46.3 %
PLATELET # BLD AUTO: 293 K/UL
POTASSIUM SERPL-SCNC: 3.7 MMOL/L
PROT SERPL-MCNC: 7.8 G/DL
RBC # BLD: 5.07 M/UL
RBC # FLD: 11.8 %
SODIUM SERPL-SCNC: 138 MMOL/L
WBC # FLD AUTO: 7.31 K/UL

## 2025-03-18 ENCOUNTER — APPOINTMENT (OUTPATIENT)
Dept: INTERNAL MEDICINE | Facility: CLINIC | Age: 25
End: 2025-03-18
Payer: COMMERCIAL

## 2025-03-18 VITALS
HEART RATE: 72 BPM | WEIGHT: 233 LBS | SYSTOLIC BLOOD PRESSURE: 118 MMHG | HEIGHT: 72 IN | DIASTOLIC BLOOD PRESSURE: 76 MMHG | BODY MASS INDEX: 31.56 KG/M2 | OXYGEN SATURATION: 99 % | TEMPERATURE: 97.7 F

## 2025-03-18 DIAGNOSIS — Z00.00 ENCOUNTER FOR GENERAL ADULT MEDICAL EXAMINATION W/OUT ABNORMAL FINDINGS: ICD-10-CM

## 2025-03-18 DIAGNOSIS — Z78.9 OTHER SPECIFIED HEALTH STATUS: ICD-10-CM

## 2025-03-18 DIAGNOSIS — Z83.438 FAMILY HISTORY OF OTHER DISORDER OF LIPOPROTEIN METABOLISM AND OTHER LIPIDEMIA: ICD-10-CM

## 2025-03-18 DIAGNOSIS — Z11.3 ENCOUNTER FOR SCREENING FOR INFECTIONS WITH A PREDOMINANTLY SEXUAL MODE OF TRANSMISSION: ICD-10-CM

## 2025-03-18 DIAGNOSIS — J34.2 DEVIATED NASAL SEPTUM: ICD-10-CM

## 2025-03-18 DIAGNOSIS — K50.80 CROHN'S DISEASE OF BOTH SMALL AND LARGE INTESTINE W/OUT COMPLICATIONS: ICD-10-CM

## 2025-03-18 DIAGNOSIS — F90.9 ATTENTION-DEFICIT HYPERACTIVITY DISORDER, UNSPECIFIED TYPE: ICD-10-CM

## 2025-03-18 DIAGNOSIS — Z80.8 FAMILY HISTORY OF MALIGNANT NEOPLASM OF OTHER ORGANS OR SYSTEMS: ICD-10-CM

## 2025-03-18 PROCEDURE — 99385 PREV VISIT NEW AGE 18-39: CPT

## 2025-03-18 PROCEDURE — 36415 COLL VENOUS BLD VENIPUNCTURE: CPT

## 2025-03-18 RX ORDER — MULTIVITAMIN
TABLET ORAL
Refills: 0 | Status: ACTIVE | COMMUNITY

## 2025-03-18 RX ORDER — FERROUS SULFATE 325(65) MG
325 (65 FE) TABLET ORAL
Refills: 0 | Status: ACTIVE | COMMUNITY

## 2025-03-18 RX ORDER — METHYLPHENIDATE HYDROCHLORIDE 36 MG/1
36 TABLET, EXTENDED RELEASE ORAL DAILY
Refills: 0 | Status: ACTIVE | COMMUNITY

## 2025-03-19 ENCOUNTER — TRANSCRIPTION ENCOUNTER (OUTPATIENT)
Age: 25
End: 2025-03-19

## 2025-03-19 LAB
25(OH)D3 SERPL-MCNC: 27.6 NG/ML
ALBUMIN SERPL ELPH-MCNC: 5 G/DL
ALP BLD-CCNC: 92 U/L
ALT SERPL-CCNC: 31 U/L
ANION GAP SERPL CALC-SCNC: 11 MMOL/L
APPEARANCE: CLEAR
AST SERPL-CCNC: 29 U/L
BACTERIA: NEGATIVE /HPF
BASOPHILS # BLD AUTO: 0.05 K/UL
BASOPHILS NFR BLD AUTO: 0.7 %
BILIRUB SERPL-MCNC: 0.3 MG/DL
BILIRUBIN URINE: NEGATIVE
BLOOD URINE: NEGATIVE
BUN SERPL-MCNC: 14 MG/DL
C TRACH RRNA SPEC QL NAA+PROBE: NOT DETECTED
CALCIUM SERPL-MCNC: 9.5 MG/DL
CAST: 0 /LPF
CHLORIDE SERPL-SCNC: 101 MMOL/L
CHOLEST SERPL-MCNC: 161 MG/DL
CO2 SERPL-SCNC: 26 MMOL/L
COLOR: YELLOW
CREAT SERPL-MCNC: 1.09 MG/DL
EGFRCR SERPLBLD CKD-EPI 2021: 95 ML/MIN/1.73M2
EOSINOPHIL # BLD AUTO: 0.6 K/UL
EOSINOPHIL NFR BLD AUTO: 8.1 %
EPITHELIAL CELLS: 0 /HPF
ESTIMATED AVERAGE GLUCOSE: 105 MG/DL
GLUCOSE QUALITATIVE U: NEGATIVE MG/DL
GLUCOSE SERPL-MCNC: 85 MG/DL
HBA1C MFR BLD HPLC: 5.3 %
HCT VFR BLD CALC: 43.9 %
HDLC SERPL-MCNC: 38 MG/DL
HGB BLD-MCNC: 15.1 G/DL
HIV1+2 AB SPEC QL IA.RAPID: NONREACTIVE
IMM GRANULOCYTES NFR BLD AUTO: 0.1 %
KETONES URINE: NEGATIVE MG/DL
LDLC SERPL-MCNC: 110 MG/DL
LEUKOCYTE ESTERASE URINE: NEGATIVE
LYMPHOCYTES # BLD AUTO: 2.9 K/UL
LYMPHOCYTES NFR BLD AUTO: 39.3 %
MAN DIFF?: NORMAL
MCHC RBC-ENTMCNC: 30.8 PG
MCHC RBC-ENTMCNC: 34.4 G/DL
MCV RBC AUTO: 89.4 FL
MICROSCOPIC-UA: NORMAL
MONOCYTES # BLD AUTO: 0.49 K/UL
MONOCYTES NFR BLD AUTO: 6.6 %
N GONORRHOEA RRNA SPEC QL NAA+PROBE: NOT DETECTED
NEUTROPHILS # BLD AUTO: 3.32 K/UL
NEUTROPHILS NFR BLD AUTO: 45.2 %
NITRITE URINE: NEGATIVE
NONHDLC SERPL-MCNC: 123 MG/DL
PH URINE: 6.5
PLATELET # BLD AUTO: 281 K/UL
POTASSIUM SERPL-SCNC: 3.9 MMOL/L
PROT SERPL-MCNC: 7.9 G/DL
PROTEIN URINE: NEGATIVE MG/DL
RBC # BLD: 4.91 M/UL
RBC # FLD: 12.3 %
RED BLOOD CELLS URINE: 0 /HPF
SODIUM SERPL-SCNC: 138 MMOL/L
SOURCE AMPLIFICATION: NORMAL
SPECIFIC GRAVITY URINE: 1.01
T PALLIDUM AB SER QL IA: NEGATIVE
T4 FREE SERPL-MCNC: 1.3 NG/DL
TRIGL SERPL-MCNC: 69 MG/DL
TSH SERPL-ACNC: 1.93 UIU/ML
UROBILINOGEN URINE: 0.2 MG/DL
VIT B12 SERPL-MCNC: 764 PG/ML
WBC # FLD AUTO: 7.37 K/UL
WHITE BLOOD CELLS URINE: 0 /HPF

## 2025-04-29 ENCOUNTER — RX RENEWAL (OUTPATIENT)
Age: 25
End: 2025-04-29

## 2025-07-22 ENCOUNTER — APPOINTMENT (OUTPATIENT)
Dept: GASTROENTEROLOGY | Facility: CLINIC | Age: 25
End: 2025-07-22
Payer: COMMERCIAL

## 2025-07-22 VITALS
TEMPERATURE: 98 F | RESPIRATION RATE: 14 BRPM | DIASTOLIC BLOOD PRESSURE: 82 MMHG | HEART RATE: 76 BPM | WEIGHT: 209 LBS | OXYGEN SATURATION: 99 % | HEIGHT: 72 IN | SYSTOLIC BLOOD PRESSURE: 120 MMHG | BODY MASS INDEX: 28.31 KG/M2

## 2025-07-22 DIAGNOSIS — K50.80 CROHN'S DISEASE OF BOTH SMALL AND LARGE INTESTINE W/OUT COMPLICATIONS: ICD-10-CM

## 2025-07-22 PROCEDURE — G2211 COMPLEX E/M VISIT ADD ON: CPT

## 2025-07-22 PROCEDURE — 99213 OFFICE O/P EST LOW 20 MIN: CPT

## 2025-07-22 PROCEDURE — 36415 COLL VENOUS BLD VENIPUNCTURE: CPT

## 2025-07-23 LAB
ALBUMIN SERPL ELPH-MCNC: 5.1 G/DL
ALP BLD-CCNC: 91 U/L
ALT SERPL-CCNC: 27 U/L
ANION GAP SERPL CALC-SCNC: 13 MMOL/L
AST SERPL-CCNC: 25 U/L
BASOPHILS # BLD AUTO: 0.06 K/UL
BASOPHILS NFR BLD AUTO: 0.7 %
BILIRUB SERPL-MCNC: 0.6 MG/DL
BUN SERPL-MCNC: 17 MG/DL
CALCIUM SERPL-MCNC: 10.2 MG/DL
CHLORIDE SERPL-SCNC: 100 MMOL/L
CO2 SERPL-SCNC: 24 MMOL/L
CREAT SERPL-MCNC: 1.03 MG/DL
CRP SERPL-MCNC: <3 MG/L
EGFRCR SERPLBLD CKD-EPI 2021: 102 ML/MIN/1.73M2
EOSINOPHIL # BLD AUTO: 0.41 K/UL
EOSINOPHIL NFR BLD AUTO: 5.1 %
ERYTHROCYTE [SEDIMENTATION RATE] IN BLOOD BY WESTERGREN METHOD: 10 MM/HR
GLUCOSE SERPL-MCNC: 80 MG/DL
HCT VFR BLD CALC: 44.5 %
HGB BLD-MCNC: 15.5 G/DL
IMM GRANULOCYTES NFR BLD AUTO: 0.2 %
LYMPHOCYTES # BLD AUTO: 2.91 K/UL
LYMPHOCYTES NFR BLD AUTO: 36 %
MAN DIFF?: NORMAL
MCHC RBC-ENTMCNC: 31.4 PG
MCHC RBC-ENTMCNC: 34.8 G/DL
MCV RBC AUTO: 90.3 FL
MONOCYTES # BLD AUTO: 0.57 K/UL
MONOCYTES NFR BLD AUTO: 7.1 %
NEUTROPHILS # BLD AUTO: 4.11 K/UL
NEUTROPHILS NFR BLD AUTO: 50.9 %
PLATELET # BLD AUTO: 303 K/UL
POTASSIUM SERPL-SCNC: 4 MMOL/L
PROT SERPL-MCNC: 8 G/DL
RBC # BLD: 4.93 M/UL
RBC # FLD: 12.1 %
SODIUM SERPL-SCNC: 137 MMOL/L
WBC # FLD AUTO: 8.08 K/UL

## 2025-08-13 ENCOUNTER — APPOINTMENT (OUTPATIENT)
Dept: INTERNAL MEDICINE | Facility: CLINIC | Age: 25
End: 2025-08-13
Payer: COMMERCIAL

## 2025-08-13 VITALS
HEIGHT: 72 IN | BODY MASS INDEX: 29.12 KG/M2 | OXYGEN SATURATION: 97 % | SYSTOLIC BLOOD PRESSURE: 122 MMHG | DIASTOLIC BLOOD PRESSURE: 70 MMHG | TEMPERATURE: 98.5 F | WEIGHT: 215 LBS | HEART RATE: 74 BPM

## 2025-08-13 DIAGNOSIS — M54.50 LOW BACK PAIN, UNSPECIFIED: ICD-10-CM

## 2025-08-13 PROCEDURE — G2211 COMPLEX E/M VISIT ADD ON: CPT

## 2025-08-13 PROCEDURE — 99214 OFFICE O/P EST MOD 30 MIN: CPT

## 2025-08-15 RX ORDER — USTEKINUMAB-AEKN 90 MG/ML
90 INJECTION, SOLUTION SUBCUTANEOUS
Qty: 3 | Refills: 0 | Status: ACTIVE | COMMUNITY
Start: 2025-08-15 | End: 1900-01-01

## 2025-08-25 ENCOUNTER — APPOINTMENT (OUTPATIENT)
Dept: ORTHOPEDIC SURGERY | Facility: CLINIC | Age: 25
End: 2025-08-25
Payer: COMMERCIAL

## 2025-08-25 ENCOUNTER — APPOINTMENT (OUTPATIENT)
Dept: INTERNAL MEDICINE | Facility: CLINIC | Age: 25
End: 2025-08-25

## 2025-08-25 VITALS — HEIGHT: 72 IN | WEIGHT: 210 LBS | BODY MASS INDEX: 28.44 KG/M2

## 2025-08-25 DIAGNOSIS — M54.50 LOW BACK PAIN, UNSPECIFIED: ICD-10-CM

## 2025-08-25 DIAGNOSIS — M47.817 SPONDYLOSIS W/OUT MYELOPATHY OR RADICULOPATHY, LUMBOSACRAL REGION: ICD-10-CM

## 2025-08-25 PROCEDURE — 72100 X-RAY EXAM L-S SPINE 2/3 VWS: CPT

## 2025-08-25 PROCEDURE — 99203 OFFICE O/P NEW LOW 30 MIN: CPT
